# Patient Record
Sex: FEMALE | Race: WHITE | NOT HISPANIC OR LATINO | Employment: FULL TIME | URBAN - METROPOLITAN AREA
[De-identification: names, ages, dates, MRNs, and addresses within clinical notes are randomized per-mention and may not be internally consistent; named-entity substitution may affect disease eponyms.]

---

## 2017-08-05 ENCOUNTER — HOSPITAL ENCOUNTER (EMERGENCY)
Facility: HOSPITAL | Age: 37
Discharge: HOME | End: 2017-08-05
Attending: EMERGENCY MEDICINE

## 2017-08-05 VITALS
SYSTOLIC BLOOD PRESSURE: 136 MMHG | WEIGHT: 178 LBS | HEART RATE: 81 BPM | RESPIRATION RATE: 16 BRPM | TEMPERATURE: 98 F | DIASTOLIC BLOOD PRESSURE: 77 MMHG | HEIGHT: 65 IN | BODY MASS INDEX: 29.66 KG/M2 | OXYGEN SATURATION: 96 %

## 2017-08-05 DIAGNOSIS — F43.0 ANXIETY IN ACUTE STRESS REACTION: Primary | ICD-10-CM

## 2017-08-05 DIAGNOSIS — F41.1 ANXIETY IN ACUTE STRESS REACTION: Primary | ICD-10-CM

## 2017-08-05 PROCEDURE — 99283 EMERGENCY DEPT VISIT LOW MDM: CPT

## 2017-08-05 PROCEDURE — 25000003 PHARM REV CODE 250: Performed by: PHYSICIAN ASSISTANT

## 2017-08-05 RX ORDER — BUPROPION HYDROCHLORIDE 150 MG/1
150 TABLET ORAL DAILY
COMMUNITY
End: 2023-11-22

## 2017-08-05 RX ORDER — DIAZEPAM 5 MG/1
5 TABLET ORAL
Status: COMPLETED | OUTPATIENT
Start: 2017-08-05 | End: 2017-08-05

## 2017-08-05 RX ORDER — LAMOTRIGINE 150 MG/1
150 TABLET ORAL DAILY
COMMUNITY

## 2017-08-05 RX ORDER — GABAPENTIN 400 MG/1
400 CAPSULE ORAL 3 TIMES DAILY
COMMUNITY
End: 2024-01-09

## 2017-08-05 RX ADMIN — DIAZEPAM 5 MG: 5 TABLET ORAL at 01:08

## 2017-08-05 NOTE — ED PROVIDER NOTES
Encounter Date: 2017       History     Chief Complaint   Patient presents with    Dizziness     c/o dizziness with ambulation only, and reports ataxia due to dizziness     This patient is a 36-year-old female presenting to the  emergency department with complaints of dizziness and lightheadedness induced by an acute anxiety reaction.  She reports a long history of generalized anxiety disorder for which she takes multiple medications.  She does report acutely worsening stress over the last few days.  She reports she has been taking her anterior anxiety medications as prescribed but that stressors became too much tonight.  She denies any concomitant confusion, syncope, chest pain, difficulty breathing, speech or slight changes.  She denies she is pregnant.          Review of patient's allergies indicates:   Allergen Reactions    Paxil [paroxetine hcl] Anaphylaxis     Past Medical History:   Diagnosis Date    Migraine headache     Neck pain      Past Surgical History:   Procedure Laterality Date     SECTION      HYSTERECTOMY      MIDDLE EAR SURGERY       History reviewed. No pertinent family history.  Social History   Substance Use Topics    Smoking status: Current Every Day Smoker    Smokeless tobacco: Not on file    Alcohol use No     Review of Systems   Constitutional: Negative for fever.   Respiratory: Negative for shortness of breath.    Cardiovascular: Negative for chest pain.   Gastrointestinal: Negative for abdominal pain.   Neurological: Positive for light-headedness.   Psychiatric/Behavioral: Negative for confusion.       Physical Exam     Initial Vitals [17 0021]   BP Pulse Resp Temp SpO2   (!) 181/84 (!) 120 16 97.9 °F (36.6 °C) 98 %      MAP       116.33         Physical Exam    Nursing note and vitals reviewed.  Constitutional: She appears well-nourished. No distress.   HENT:   Head: Normocephalic and atraumatic.   Eyes: Conjunctivae and EOM are normal.   Neck: Normal range of  motion.   Cardiovascular: Normal rate and regular rhythm.   Pulmonary/Chest: No respiratory distress.   Abdominal: She exhibits no distension.   Musculoskeletal: Normal range of motion. She exhibits no edema.   Neurological: She is alert and oriented to person, place, and time.   Skin: Skin is warm and dry.   Psychiatric: She has a normal mood and affect. Thought content normal.         ED Course   Procedures  Labs Reviewed - No data to display          Medical Decision Making:   Initial Assessment:   This patient was interviewed and examined after being provided antianxiety medication.  At the time of my evaluation she reported that her anxiety was greatly improved and she was no longer in distress.  She does not have any concerns about accompanying symptoms tonight and reports that these are familiar.  She currently is requesting discharge for outpatient psychiatric follow-up with her doctor and I do think this is appropriate.  ED Management:  She is asked to return to ER for any new, concerning, or worsening symptoms.  Patient was agreeable with this plan for follow-up and she was discharged in stable condition with her significant other as a .                   ED Course     Clinical Impression:   The encounter diagnosis was Anxiety in acute stress reaction.    Disposition:   Disposition: Discharged  Condition: Stable                        Chapincito Ferguson MD  08/05/17 0540

## 2017-11-25 ENCOUNTER — HOSPITAL ENCOUNTER (EMERGENCY)
Facility: HOSPITAL | Age: 37
Discharge: HOME | End: 2017-11-25
Attending: EMERGENCY MEDICINE
Payer: MEDICAID

## 2017-11-25 VITALS
OXYGEN SATURATION: 97 % | HEART RATE: 85 BPM | TEMPERATURE: 99 F | DIASTOLIC BLOOD PRESSURE: 64 MMHG | BODY MASS INDEX: 25.1 KG/M2 | WEIGHT: 170 LBS | SYSTOLIC BLOOD PRESSURE: 132 MMHG | RESPIRATION RATE: 17 BRPM

## 2017-11-25 DIAGNOSIS — S62.102A CLOSED FRACTURE OF LEFT WRIST, INITIAL ENCOUNTER: Primary | ICD-10-CM

## 2017-11-25 DIAGNOSIS — S69.92XA WRIST INJURY, LEFT, INITIAL ENCOUNTER: ICD-10-CM

## 2017-11-25 PROCEDURE — 96372 THER/PROPH/DIAG INJ SC/IM: CPT

## 2017-11-25 PROCEDURE — 63600175 PHARM REV CODE 636 W HCPCS: Performed by: EMERGENCY MEDICINE

## 2017-11-25 PROCEDURE — 99283 EMERGENCY DEPT VISIT LOW MDM: CPT | Mod: 25

## 2017-11-25 PROCEDURE — 29125 APPL SHORT ARM SPLINT STATIC: CPT

## 2017-11-25 RX ORDER — MORPHINE SULFATE 2 MG/ML
6 INJECTION, SOLUTION INTRAMUSCULAR; INTRAVENOUS
Status: COMPLETED | OUTPATIENT
Start: 2017-11-25 | End: 2017-11-25

## 2017-11-25 RX ORDER — MORPHINE SULFATE 10 MG/ML
INJECTION INTRAMUSCULAR; INTRAVENOUS; SUBCUTANEOUS
Status: DISCONTINUED
Start: 2017-11-25 | End: 2017-11-25 | Stop reason: WASHOUT

## 2017-11-25 RX ORDER — HYDROCODONE BITARTRATE AND ACETAMINOPHEN 5; 325 MG/1; MG/1
1 TABLET ORAL EVERY 6 HOURS PRN
Qty: 15 TABLET | Refills: 0 | Status: SHIPPED | OUTPATIENT
Start: 2017-11-25 | End: 2017-11-29 | Stop reason: ALTCHOICE

## 2017-11-25 RX ADMIN — Medication 6 MG: at 03:11

## 2017-11-25 NOTE — ED NOTES
AAOx4, skin warm/dry, respirations even/unlabored.. Left wrist splinted.  Some swelling to left hand; limited ROM to fingers.  Capillary refill < 1 second.  Radial pulse inaccessible due to splint.

## 2017-11-25 NOTE — DISCHARGE INSTRUCTIONS
If you would like to follow up with the [facility] for further care of your  fracture, please call the [facility] Scheduling Department at [phone]  during business hours. Please let the  know you need a fracture follow-up appointment with Orthopedics, and you will be scheduled in the Orthopedic Clinic.    Please bring your original Emergency Department discharge papers and your disc of x-ray images with you to the  clinic appointment.
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
## 2017-11-29 ENCOUNTER — HOSPITAL ENCOUNTER (EMERGENCY)
Facility: HOSPITAL | Age: 37
Discharge: HOME | End: 2017-11-29
Attending: EMERGENCY MEDICINE
Payer: MEDICAID

## 2017-11-29 VITALS
SYSTOLIC BLOOD PRESSURE: 121 MMHG | OXYGEN SATURATION: 98 % | HEART RATE: 86 BPM | WEIGHT: 170 LBS | RESPIRATION RATE: 16 BRPM | TEMPERATURE: 99 F | DIASTOLIC BLOOD PRESSURE: 58 MMHG | HEIGHT: 70 IN | BODY MASS INDEX: 24.34 KG/M2

## 2017-11-29 DIAGNOSIS — L01.00 IMPETIGO: Primary | ICD-10-CM

## 2017-11-29 DIAGNOSIS — S52.502D CLOSED FRACTURE OF DISTAL END OF LEFT RADIUS WITH ROUTINE HEALING, UNSPECIFIED FRACTURE MORPHOLOGY, SUBSEQUENT ENCOUNTER: ICD-10-CM

## 2017-11-29 PROCEDURE — 99282 EMERGENCY DEPT VISIT SF MDM: CPT

## 2017-11-29 RX ORDER — MUPIROCIN 20 MG/G
OINTMENT TOPICAL 3 TIMES DAILY
Qty: 1 TUBE | Refills: 0 | Status: SHIPPED | OUTPATIENT
Start: 2017-11-29 | End: 2018-06-21

## 2017-11-29 RX ORDER — LAMOTRIGINE 100 MG/1
75 TABLET ORAL DAILY
COMMUNITY
End: 2019-11-08 | Stop reason: CLARIF

## 2017-11-29 NOTE — ED PROVIDER NOTES
"Encounter Date: 2017    SCRIBE #1 NOTE: IAbhishek, am scribing for, and in the presence of, Clara Herring NP.       History     Chief Complaint   Patient presents with    Rash     son has ringworm    Medication Refill     has broken arm. out of norco       2017 2:02 PM     Chief complaint: Rash and Medication Refill      Keily Moreno is a 37 y.o. female with a hx of Migraines, CA, COPD, Seizures, Anxiety and Depression who presents to the ED with complaints of throbbing left arm pain after a closed wrist fx x 5 days. Pt was given 5mg NORCO, reports 1 dosage left, and request a refill till her orthopedic appointment 2018. She also reports lesions on upper left shoulder, buttock, and inner thigh for a few days associated with yellow purulent drainage "when popped." She reports sick contact, pt's spouse son has ringworms. Allergens include Paxil.       The history is provided by the patient.     Review of patient's allergies indicates:   Allergen Reactions    Paxil [paroxetine hcl] Other (See Comments)     Past Medical History:   Diagnosis Date    Anxiety     Cancer     COPD (chronic obstructive pulmonary disease)     Depression     Encounter for blood transfusion     Migraine     Seizures      Past Surgical History:   Procedure Laterality Date    ABDOMINAL SURGERY       SECTION, CLASSIC      HYSTERECTOMY      TONSILLECTOMY       Family History   Problem Relation Age of Onset    Hypertension Mother     Depression Mother     Alcohol abuse Mother     Miscarriages / Stillbirths Mother     Depression Father     Hypertension Father     Hyperlipidemia Father     Alcohol abuse Father     Asthma Daughter      Social History   Substance Use Topics    Smoking status: Current Every Day Smoker     Packs/day: 1.00     Years: 10.00     Start date: 1995    Smokeless tobacco: Not on file    Alcohol use No     Review of Systems   Constitutional: Negative for fever. "   HENT: Negative for sore throat.    Eyes: Negative for redness.   Respiratory: Negative for shortness of breath.    Cardiovascular: Negative for chest pain.   Gastrointestinal: Negative for nausea and vomiting.   Genitourinary: Negative for dysuria.   Musculoskeletal: Positive for myalgias (RUE). Negative for back pain, joint swelling, neck pain and neck stiffness.   Skin: Positive for rash. Negative for color change and wound.   Neurological: Negative for weakness.   Hematological: Does not bruise/bleed easily.       Physical Exam     Initial Vitals [11/29/17 1242]   BP Pulse Resp Temp SpO2   (!) 121/58 86 16 99.1 °F (37.3 °C) 98 %      MAP       79         Physical Exam    Nursing note and vitals reviewed.  Constitutional: Vital signs are normal. She appears well-developed and well-nourished.   HENT:   Head: Normocephalic and atraumatic.   Eyes: Pupils are equal, round, and reactive to light.   Neck: Neck supple.   Cardiovascular: Normal rate, regular rhythm, normal heart sounds and intact distal pulses.   No murmur heard.  Pulmonary/Chest: Breath sounds normal.   Abdominal: Soft. Normal appearance and bowel sounds are normal.   Musculoskeletal: She exhibits no edema or tenderness.   Left arm in sugar tong splint intact; cap refill of left hand brisk; no swelling; extremity warm; no signs of neurovascular compromise.    Neurological: She is alert and oriented to person, place, and time. She has normal strength.   Skin: Skin is warm, dry and intact. Capillary refill takes less than 2 seconds. Lesion and rash noted.   Left posterior shoulder with approximately 6 1 cm lesions with with scaling; left upper thigh and bi inner thighs with similar appearing lesions without drainage or surrounding erythema.   Psychiatric: She has a normal mood and affect. Her speech is normal and behavior is normal.         ED Course   Procedures  Labs Reviewed - No data to display                APC / Resident Notes:   Patient is a 37  y.o. female who presents to the ED 11/30/2017 who underwent emergent evaluation for rash and left upper shoulder pain.  Patient has erythema A shows maculopapular lesions with central scaling to left shoulder and left inner thigh and right inner thigh and left posterior thigh; he should has child with current impetigo.  She has no oral or in 12 lesions.  Lesions without fluctuance or drainage or surrounding erythema; doubt abscess or cellulitis.  Left upper extremity in sugar tong splint.  Recent left wrist x-ray consistent with fracture.  Left hand warm with brisk cap refill no swelling; no signs of neurovascular compromise.  He saw requesting more narcotic pain medication.  Patient has a known history of opioid abuse.  Patient recommended to take NSAIDs as needed for pain and orthopedic follow-up return precautions discussed; patient verbalized understanding.  patient discharged in stable condition.          Scribe Attestation:   Scribe #1: I performed the above scribed service and the documentation accurately describes the services I performed. I attest to the accuracy of the note.    Attending Attestation:     Physician Attestation Statement for NP/PA:   I have conducted a face to face encounter with this patient in addition to the NP/PA, due to NP/PA Request    Other NP/PA Attestation Additions:      Medical Decision Making: Keily Moreno is a 37 y.o. female presenting with rash marked by macular lesions on the extremities and trunk in the setting of known impetigo and child.  No central clearing.  There is some mild scaling noted.  There is no induration with mild erythema present.  There are nontender with negative Nikolsky sign.  No intraoral lesions.  I suspect impetigo.  Topical knee process until resolved recommended with PCP follow-up.  Patient has known left radial and ulnar fracture.  Seen here with splint for mobilization.  2+ radial pulses on exam with intact distal motor and sensory function.  I  strongly encouraged her to follow up with orthopedics as recommended.  Multiple days have passed and she exhausted prescription for opioids.  The patient does have a prior history of opioid abuse.  I do not think more prolonged course of opioids is in her best interest and NSAIDs as necessary for now pending orthopedic follow-up is appropriate.  I doubt compartment syndrome.  No neurovascular compromise.  No recent recurrent injury.  I do not think repeat imaging is indicated.  Follow-up as planned.  Return precautions reviewed.       Physician Attestation for Scribe:  Physician Attestation Statement for Scribe #1: I, Clara Herring, reviewed documentation, as scribed by in my presence, and it is both accurate and complete.     Comments: I, Clara Herring NP-C, personally performed the services described in this documentation. All medical record entries made by the scribe were at my direction and in my presence.  I have reviewed the chart and agree that the record reflects my personal performance and is accurate and complete. CASSIE Her.  7:43 PM 11/30/2017             ED Course      Clinical Impression:     1. Impetigo    2. Closed fracture of distal end of left radius with routine healing, unspecified fracture morphology, subsequent encounter        Disposition:   Disposition: Discharged  Condition: Stable                        Clara Herring NP  11/30/17 1943

## 2018-05-25 ENCOUNTER — TELEPHONE (OUTPATIENT)
Dept: TRANSPLANT | Facility: CLINIC | Age: 38
End: 2018-05-25

## 2018-05-25 NOTE — TELEPHONE ENCOUNTER
----- Message from Clarence Boswell sent at 5/25/2018  2:34 PM CDT -----  We have the pt recorders and they are now pending review by the referral nurse.  By:Clarence Boswell

## 2018-05-29 ENCOUNTER — DOCUMENTATION ONLY (OUTPATIENT)
Dept: TRANSPLANT | Facility: CLINIC | Age: 38
End: 2018-05-29

## 2018-05-29 NOTE — LETTER
May 29, 2018    Kesha Walker, [facility]  [address] [facility] [address]      Dear Dr. Walker    Patient: Keily Moreno   MR Number: [id number]   YOB: 1980     Thank you for the referral of Keily Moreno to the [facility] program. An initial appointment will be scheduled for your patient with one of our Hepatologists.      Thank you again for your trust in our program.  If there is anything we can do for you or your staff, please feel free to contact us.        Sincerely,        [facility]  [address] [place] [id number]  [phone]

## 2018-05-29 NOTE — LETTER
May 29, 2018    Keily Moreno  [address] [facility] [address]      Dear Keily Moreno:    Your doctor has referred you to the [facility]. You will be contacted by our office and an initial appointment will then be scheduled for you.    We look forward to seeing you soon. If you have any further questions, please contact us at [phone].       Sincerely,        [facility]   [address] [place] [address] [phone]

## 2018-05-29 NOTE — NURSING
Pt records reviewed.  Pt will be referred to Hepatitis C Clinic due to HEP C   Initial referral received from Kesha Walker NP   Referral letter sent to provider and patient.

## 2018-06-05 DIAGNOSIS — B18.2 CHRONIC HEPATITIS C WITHOUT HEPATIC COMA: Primary | ICD-10-CM

## 2018-06-20 ENCOUNTER — TELEPHONE (OUTPATIENT)
Dept: HEPATOLOGY | Facility: CLINIC | Age: 38
End: 2018-06-20

## 2018-06-20 NOTE — TELEPHONE ENCOUNTER
Deanna with patient access spoke with patient.  Patient asked that appts in hepatology scheduled 6/21 be cancelled because she has insurance issues. Patient states that she will call us back to reschedule once issue resolved; appts cancelled.

## 2018-06-20 NOTE — TELEPHONE ENCOUNTER
----- Message from Jeanne Bell sent at 6/20/2018 12:43 PM CDT -----  Contact: pt  Needs Advice    Reason for call:    Pt called to request 6/21/18 appt back.   Communication Preference:  [phone]  Additional Information: n/a

## 2018-06-21 ENCOUNTER — INITIAL CONSULT (OUTPATIENT)
Dept: HEPATOLOGY | Facility: CLINIC | Age: 38
End: 2018-06-21
Payer: MEDICAID

## 2018-06-21 ENCOUNTER — TELEPHONE (OUTPATIENT)
Dept: HEPATOLOGY | Facility: CLINIC | Age: 38
End: 2018-06-21

## 2018-06-21 ENCOUNTER — PROCEDURE VISIT (OUTPATIENT)
Dept: HEPATOLOGY | Facility: CLINIC | Age: 38
End: 2018-06-21
Attending: PHYSICIAN ASSISTANT
Payer: MEDICAID

## 2018-06-21 VITALS
BODY MASS INDEX: 28.15 KG/M2 | SYSTOLIC BLOOD PRESSURE: 114 MMHG | DIASTOLIC BLOOD PRESSURE: 66 MMHG | RESPIRATION RATE: 18 BRPM | WEIGHT: 190.06 LBS | OXYGEN SATURATION: 97 % | HEIGHT: 69 IN | HEART RATE: 82 BPM | TEMPERATURE: 98 F

## 2018-06-21 DIAGNOSIS — B18.2 CHRONIC HEPATITIS C WITHOUT HEPATIC COMA: ICD-10-CM

## 2018-06-21 DIAGNOSIS — D64.9 ANEMIA, UNSPECIFIED TYPE: Primary | ICD-10-CM

## 2018-06-21 DIAGNOSIS — B18.2 CHRONIC HEPATITIS C WITHOUT HEPATIC COMA: Primary | ICD-10-CM

## 2018-06-21 PROCEDURE — 99999 PR PBB SHADOW E&M-EST. PATIENT-LVL IV: CPT | Mod: PBBFAC,,, | Performed by: PHYSICIAN ASSISTANT

## 2018-06-21 PROCEDURE — 99203 OFFICE O/P NEW LOW 30 MIN: CPT | Mod: S$PBB,,, | Performed by: PHYSICIAN ASSISTANT

## 2018-06-21 PROCEDURE — 91200 LIVER ELASTOGRAPHY: CPT | Mod: 26,S$PBB,, | Performed by: PHYSICIAN ASSISTANT

## 2018-06-21 PROCEDURE — 91200 LIVER ELASTOGRAPHY: CPT | Mod: PBBFAC | Performed by: PHYSICIAN ASSISTANT

## 2018-06-21 PROCEDURE — 99214 OFFICE O/P EST MOD 30 MIN: CPT | Mod: PBBFAC | Performed by: PHYSICIAN ASSISTANT

## 2018-06-21 RX ORDER — BUSPIRONE HYDROCHLORIDE 7.5 MG/1
TABLET ORAL
Refills: 2 | COMMUNITY
Start: 2018-04-26 | End: 2019-11-08 | Stop reason: CLARIF

## 2018-06-21 NOTE — PROGRESS NOTES
HEPATOLOGY CLINIC VISIT NOTE - HCV clinic    OUTSIDE REFERRING PROVIDER: Kesha Walker NP    CHIEF COMPLAINT: Hepatitis C   (here w/ boyfriend    HISTORY: This is a 37 y.o. White female with chronic hepatitis C, here for further eval / mngmt.   Outside records have been received / reviewed.    HCV history:  Recently diagnosed by PCP  Risks for HCV: Tattoo placement, first one     IVDA - first use ; has been clean for 18 months (since 2016)    Incarcerated [id number]; (+) tattoo placement while there    Sexual relations w/ female that had HCV -     (+) blood transfusions, but none before     - Treatment naive  - Genotype 2b  - HCV RNA 1,420,000 IU/mL - 2018 (outside labs)    Liver staging:  FibroScan today 18 - kPa 3.5, F0-1  No liver imaging    Labs reveal well preserved liver function, normal liver panel    Feels well  Denies jaundice, dark urine, abdominal distention, hematemesis, melena, slowed mentation.   No abnormal skin rashes. No generalized joint pain.       Past Medical History:   Diagnosis Date    Anxiety     Chronic hepatitis C     Depression     Encounter for blood transfusion , , 2012    History of cervical cancer 2006    diagnosed , hysterectomy     Migraine     previously on topamax, imitrex, fioricet    Seizures     occurred during benzo detox       Past Surgical History:   Procedure Laterality Date    ABDOMINAL SURGERY       SECTION, CLASSIC      HYSTERECTOMY      TONSILLECTOMY         FAMILY HISTORY: Negative for liver disease    SOCIAL HISTORY:   History   Smoking Status    Current Every Day Smoker    Packs/day: 1.00    Years: 10.00    Start date: 1995   Smokeless Tobacco    Not on file       Alcohol - up to 12 pack per day x 1 year, none x 18 months  Drugs - prior drug use, see above. Clean since 2016. Currently enrolled in drug court      ROS:   No fever, chills, weight loss, fatigue  No chest pain, palpitations,  dyspnea, cough  No abdominal pain, change in bowel pattern, nausea, vomiting, GERD  No dysuria, hematuria   No skin rashes   No headaches  No lower extremity edema  No depression or anxiety      PHYSICAL EXAM:  Friendly White female, in no acute distress; alert and oriented to person, place and time  VITALS: reviewed  HEENT: Sclerae anicteric.   NECK: Supple  CVS: Regular rate and rhythm. No murmurs  LUNGS: Normal respiratory effort. Clear bilaterally  ABDOMEN: Flat, soft, nontender. No organomegaly or masses. No ascites or hernias. Good bowel sounds.    SKIN: Warm and dry. No jaundice, No obvious rashes. (+) tattoos  EXTREMITIES: No lower extremity edema  NEURO/PSYCH: Normal gate. Memory intact. Thought and speech pattern appropriate. Behavior normal. No depression or anxiety noted.    RECENT LABS:  Outside labs  3/21/18  HBsAg neg  AST 26, ALT 35, TBILI 0.3, ALBUMIN 4.4, ALKPHOS 48  2/26/18  WBC 6.1, HGB 11.4,     RECENT IMAGING:  None to review    ASSESSMENT  37 y.o. White female with:  1. CHRONIC HEPATITIS C, GENOTYPE 2b - treatment naive  -- FibroScan today 6/21/18 - kPa 3.5, F0-1  -- Unknown Immunity to HAV & HBV    2. HISTORY OF DRUG USE, CLEAN SINCE 12/2016        EDUCATION:  The natural history of Hepatitis C, including potential progression to cirrhosis was reviewed.   We discussed the increased progression of liver disease secondary to alcohol use; patient was advised to avoid alcohol completely.     Transmission of Hepatitis C was reviewed, including possible sexual transmission. Sexual contacts should be screened. Risk of vertical transmission of Hepatitis C from mother to baby was reviewed. Children should be screened. Patient should avoid sharing personal products such as razors, toothbrushes, etc.     We reviewed that vaccination against Hepatitis A and Hepatitis B is recommended if patient does not already have immunity.    Recommend avoiding raw seafood.  Limit acetaminophen to 2000mg  daily.          PLAN:  1. Labs today  Orders Placed This Encounter   Procedures    CBC auto differential    Comprehensive metabolic panel    Protime-INR    HIV-1 and HIV-2 antibodies    Hepatitis B surface antigen    Hepatitis B surface antibody    Hepatitis B core antibody, total    Hepatitis A antibody, IgG    HCV FibroSURE     2. U/S abdomen  3. F/U visit. Goal of antiviral therapy

## 2018-06-21 NOTE — PROCEDURES
Procedures     Fibroscan Procedure     Name: Keily Moreno  Date of Procedure : 2018   :: Jennifer B Scheuermann, PA  Diagnosis: HCV    Probe: XL    Fibroscan reading: 3.5 KPa    Fibrosis:F 0-1     CAP readin dB/m    Steatosis: :S2

## 2018-06-21 NOTE — TELEPHONE ENCOUNTER
6/21/18 labs reviewed:  CBC - anemia  Hgb 10.9, MCV 80    Spoke to pt:  Prior hysterectomy  No vaginal bleeding  (+) BRBPR - on tissue after BMs intermittently  (+) daily BC powder  No abdominal pain but decreased appetite  No n/v, black stool, hematemesis  No hematuria    She had appt scheduled w/ outside GI last week but had to cancel due to insurance issues. Plans to r/s    Recommendations:  1. Labs Fri 6/22 - ferritin, fe/tibc, b12, folate  2. Pt to r/s GI appt  3. D/C BC powder. Can take tylenol up to 2,000-3,000mg daily

## 2018-06-25 ENCOUNTER — LAB VISIT (OUTPATIENT)
Dept: LAB | Facility: HOSPITAL | Age: 38
End: 2018-06-25
Attending: PHYSICIAN ASSISTANT
Payer: MEDICAID

## 2018-06-25 ENCOUNTER — PATIENT MESSAGE (OUTPATIENT)
Dept: HEPATOLOGY | Facility: CLINIC | Age: 38
End: 2018-06-25

## 2018-06-25 ENCOUNTER — TELEPHONE (OUTPATIENT)
Dept: HEPATOLOGY | Facility: CLINIC | Age: 38
End: 2018-06-25

## 2018-06-25 DIAGNOSIS — D64.9 ANEMIA, UNSPECIFIED TYPE: ICD-10-CM

## 2018-06-25 LAB
FERRITIN SERPL-MCNC: 12 NG/ML
FOLATE SERPL-MCNC: 4.3 NG/ML
IRON SERPL-MCNC: 19 UG/DL
SATURATED IRON: 5 %
TOTAL IRON BINDING CAPACITY: 370 UG/DL
TRANSFERRIN SERPL-MCNC: 250 MG/DL
VIT B12 SERPL-MCNC: 780 PG/ML

## 2018-06-25 PROCEDURE — 82728 ASSAY OF FERRITIN: CPT

## 2018-06-25 PROCEDURE — 36415 COLL VENOUS BLD VENIPUNCTURE: CPT | Mod: PO

## 2018-06-25 PROCEDURE — 83540 ASSAY OF IRON: CPT

## 2018-06-25 PROCEDURE — 82607 VITAMIN B-12: CPT

## 2018-06-25 PROCEDURE — 82746 ASSAY OF FOLIC ACID SERUM: CPT

## 2018-06-25 NOTE — TELEPHONE ENCOUNTER
Results reviewed   6/25/2018 08:01   Iron 19 (L)   TIBC 370   Saturated Iron 5 (L)   Transferrin 250   Ferritin 12 (L)   Folate 4.3   Vitamin B-12 780       (+) Fe deficiency  Pt notified via [facility]  Inquired whether pt has rescheduled appt w/ GI and made sure she stopped BC powder. Likely needs EGD

## 2018-06-28 ENCOUNTER — TELEPHONE (OUTPATIENT)
Dept: HEPATOLOGY | Facility: CLINIC | Age: 38
End: 2018-06-28

## 2018-06-28 NOTE — TELEPHONE ENCOUNTER
6/21/18 labs:  Prior resolved HBV infection  Lacking immunity to HAV    Vaccine for HAV sent to [facility] [place] to investigate benefits    Pt [facility]

## 2018-07-02 ENCOUNTER — HOSPITAL ENCOUNTER (OUTPATIENT)
Dept: RADIOLOGY | Facility: CLINIC | Age: 38
Discharge: HOME | End: 2018-07-02
Attending: PHYSICIAN ASSISTANT
Payer: MEDICAID

## 2018-07-02 DIAGNOSIS — B18.2 CHRONIC HEPATITIS C WITHOUT HEPATIC COMA: ICD-10-CM

## 2018-07-02 PROCEDURE — 76700 US EXAM ABDOM COMPLETE: CPT | Mod: TC,PO

## 2018-07-02 PROCEDURE — 76700 US EXAM ABDOM COMPLETE: CPT | Mod: 26,,, | Performed by: RADIOLOGY

## 2018-07-11 ENCOUNTER — PATIENT MESSAGE (OUTPATIENT)
Dept: HEPATOLOGY | Facility: CLINIC | Age: 38
End: 2018-07-11

## 2018-08-03 ENCOUNTER — OFFICE VISIT (OUTPATIENT)
Dept: HEPATOLOGY | Facility: CLINIC | Age: 38
End: 2018-08-03
Payer: MEDICAID

## 2018-08-03 ENCOUNTER — TELEPHONE (OUTPATIENT)
Dept: PHARMACY | Facility: CLINIC | Age: 38
End: 2018-08-03

## 2018-08-03 ENCOUNTER — LAB VISIT (OUTPATIENT)
Dept: LAB | Facility: HOSPITAL | Age: 38
End: 2018-08-03
Payer: MEDICAID

## 2018-08-03 VITALS
WEIGHT: 190.06 LBS | HEIGHT: 71 IN | HEART RATE: 88 BPM | SYSTOLIC BLOOD PRESSURE: 122 MMHG | BODY MASS INDEX: 26.61 KG/M2 | DIASTOLIC BLOOD PRESSURE: 72 MMHG | OXYGEN SATURATION: 100 %

## 2018-08-03 DIAGNOSIS — R74.8 ABNORMAL TRANSAMINASES: ICD-10-CM

## 2018-08-03 DIAGNOSIS — B18.2 CHRONIC HEPATITIS C WITHOUT HEPATIC COMA: ICD-10-CM

## 2018-08-03 DIAGNOSIS — B18.2 CHRONIC HEPATITIS C WITHOUT HEPATIC COMA: Primary | ICD-10-CM

## 2018-08-03 PROCEDURE — 87522 HEPATITIS C REVRS TRNSCRPJ: CPT

## 2018-08-03 PROCEDURE — 99999 PR PBB SHADOW E&M-EST. PATIENT-LVL III: CPT | Mod: PBBFAC,,, | Performed by: PHYSICIAN ASSISTANT

## 2018-08-03 PROCEDURE — 99213 OFFICE O/P EST LOW 20 MIN: CPT | Mod: S$PBB,,, | Performed by: PHYSICIAN ASSISTANT

## 2018-08-03 PROCEDURE — 99213 OFFICE O/P EST LOW 20 MIN: CPT | Mod: PBBFAC | Performed by: PHYSICIAN ASSISTANT

## 2018-08-03 PROCEDURE — 36415 COLL VENOUS BLD VENIPUNCTURE: CPT

## 2018-08-03 NOTE — PROGRESS NOTES
HEPATOLOGY CLINIC VISIT NOTE - HCV clinic    CHIEF COMPLAINT: Hepatitis C   (here w/ boyfriend)    HISTORY: This is a 37 y.o. White female with chronic hepatitis C, here for f/u after having additional labs, imaging, liver staging.    Interval history:  Labs after visit revealed microcytic anemia (Hgb 10.9) w/ Fe def; normal B12 and folate   - has had prior hysterectomy and denies vaginal bleeding   - reported hematochezia - blood on tissues intermittently   - (+) daily BC powder and fatigue  Was advised to take iron but not taking it due to hx of severe constipation (recently started linzess)  Saw outside GI (Dr Guerra) on 8/1. Protonix, zofran prescribed; has EGD scheduled next week      HIV screen neg  Prior HBV infection w/ grayzone HBsAb and neg HBsAg  Lacking HAV immunity - Rx sent to [facility] [place] 6/28/18 but hasn't heard anything    Additional liver staging again indicates minimal disease    Doing well overall  Decreased appetite, nausea, abd discomfort that she has discussed w/ GI  Denies jaundice, dark urine, hematemesis, melena, slowed mentation, abdominal distention.       HCV history:  Recently diagnosed by PCP  Risks for HCV: Tattoo placement, first one 2002    IVDA - first use 2008; has been clean for 18 months (since 12/2016)    Incarcerated [id number]; (+) tattoo placement while there    Sexual relations w/ female that had HCV - 2011    (+) blood transfusions, but none before 1992    - Treatment naive  - Genotype 2b  - HCV RNA 1,420,000 IU/mL - 4/2018 (outside labs)    Liver staging:  FibroScan today 6/21/18 - kPa 3.5, F0-1  HCV FibroSURE 6/21/18 - A1-2, F0        Past Medical History:   Diagnosis Date    Anxiety     Chronic hepatitis C     Depression     Encounter for blood transfusion 2004, 2008, 2012    History of cervical cancer 2006    diagnosed 2006, hysterectomy 2012    Migraine     previously on topamax, imitrex, fioricet    Seizures 2009    occurred during benzo detox        Past Surgical History:   Procedure Laterality Date     SECTION, CLASSIC      HYSTERECTOMY      TONSILLECTOMY         FAMILY HISTORY: Negative for liver disease    SOCIAL HISTORY:   2 year old son w/ boyfriend as well as 3 older children (ages 9 through teenage)  History   Smoking Status    Former Smoker    Packs/day: 1.00    Years: 10.00    Start date: 1995    Quit date: 2017   Smokeless Tobacco    Not on file       Alcohol - up to 12 pack per day x 1 year, none > 19 months  Drugs - prior drug use, see above. Clean since 2016. Currently enrolled in drug court      ROS:   No fever, chills, weight loss, fatigue  No chest pain, palpitations, dyspnea, cough  (+) abdominal pain, (+) constipation, (+) nausea, No vomiting, GERD  No skin rashes   No headaches  No depression or anxiety      PHYSICAL EXAM:  Friendly White female, in no acute distress; alert and oriented to person, place and time  VITALS: reviewed  HEENT: Sclerae anicteric.   NECK: Supple  LUNGS: Normal respiratory effort.   ABDOMEN: Flat, soft, nondistended  SKIN: Warm and dry. No jaundice, No obvious rashes. (+) tattoos  NEURO/PSYCH: Normal gate. Memory intact. Thought and speech pattern appropriate. Behavior normal. No depression or anxiety noted.    RECENT LABS:  Outside labs  3/21/18  HBsAg neg  AST 26, ALT 35, TBILI 0.3, ALBUMIN 4.4, ALKPHOS 48  18  WBC 6.1, HGB 11.4,     RECENT IMAGING:  Results for orders placed during the hospital encounter of 18   US Abdomen Complete    Narrative EXAMINATION:  US ABDOMEN COMPLETE    CLINICAL HISTORY:  Chronic viral hepatitis C    COMPARISON:  None    FINDINGS:  There is elongation of the right hepatic lobe which is a normal variant.  The liver is otherwise unremarkable.  The gallbladder and bile ducts are normal.  Common bile duct diameter is 4.8 mm.  Doppler of the main portal vein confirms a normal waveform and direction of flow.  The spleen and pancreas are  normal in size and appearance.  A calculus measuring 6 mm is present in the lower pole of the right kidney.  The kidneys are otherwise unremarkable.  The right kidney measures 11.4 cm in length and the left kidney measures 11.7 cm.  The aorta tapers normally and the inferior vena cava is unremarkable.  No ascites is present.      Impression 1. Elongation of the right hepatic lobe which is a normal variant.  2. Small nonobstructing right renal calculus.      Electronically signed by: Kojo Ng MD  Date:    07/02/2018  Time:    11:47       ASSESSMENT  37 y.o. White female with:  1. CHRONIC HEPATITIS C, GENOTYPE 2b - treatment naive  -- FibroScan 6/21/18 - kPa 3.5, F0-1  -- lacking Immunity to HAV   -- prior exposure to HBV (pos HBcAb)    2. HISTORY OF DRUG USE, CLEAN SINCE 12/2016    3. FE DEF ANEMIA, ABD PAIN, NAUSEA, HX OF BC POWDER USE  -- following w/ GI, EGD scheduled next week    EDUCATION:  Discussed goal of HCV eradication to prevent progression of liver disease.  Discussed use of Mavyret (3 pills daily with food) x 8 weeks with potential side effects of fatigue, headache, nausea, diarrhea.     Discussed potential for drug interactions with Mavyret.  Current med list reviewed - no interactions noted.  Pt to contact me if new medicines are prescribed.  Herbal / alternative therapies must be avoided.    Discussed importance of medication adherence and risk of treatment failure / viral resistance if not adherent. Pt has verbalized understanding.          PLAN:  1. HAV vaccine to [facility]  2. HCV RNA today  3. Obtain authorization to treat HCV with Mavyret daily x 8 weeks  -- Rx will be routed to [facility]  -- Patient will notify me of exact treatment start date so appropriate lab f/u can be scheduled.    Cc: Kesha Walker NP

## 2018-08-06 LAB
HCV LOG: 6.04 LOG (10) IU/ML
HCV RNA QUANT PCR: ABNORMAL IU/ML
HCV, QUALITATIVE: DETECTED IU/ML

## 2018-08-27 NOTE — TELEPHONE ENCOUNTER
Assisting patient in applying to [employer] patient assistance foundation since her insurance has denied her Mavyret. Sending Jennifer Scheuermann PA a staff message regarding assistance and application for signature.

## 2018-08-29 ENCOUNTER — EPISODE CHANGES (OUTPATIENT)
Dept: HEPATOLOGY | Facility: CLINIC | Age: 38
End: 2018-08-29

## 2018-09-12 ENCOUNTER — TELEPHONE (OUTPATIENT)
Dept: HEPATOLOGY | Facility: CLINIC | Age: 38
End: 2018-09-12

## 2018-09-12 NOTE — TELEPHONE ENCOUNTER
FOR DOCUMENTATION ONLY:  Financial Assistance for Mavyret is approved from 9-12-18 to 3-11-19  (8 weeks)  Source [employer] Patient Assistance  Sending a staff message to Jennifer Scheuermann PA regarding approval.

## 2018-09-12 NOTE — TELEPHONE ENCOUNTER
----- Message from Rashida Nielsen sent at 9/12/2018 11:02 AM CDT -----  Regarding: Mavyret Assistance Approval     Patient was approved to receive her Mavyret from [employer] Patient Assistance X 8 weeks. Patient has been informed.  Thank you  Shara  [id number]

## 2018-09-17 ENCOUNTER — PATIENT MESSAGE (OUTPATIENT)
Dept: HEPATOLOGY | Facility: CLINIC | Age: 38
End: 2018-09-17

## 2018-09-17 DIAGNOSIS — D64.9 ANEMIA, UNSPECIFIED TYPE: Primary | ICD-10-CM

## 2018-09-20 ENCOUNTER — TELEPHONE (OUTPATIENT)
Dept: PHARMACY | Facility: CLINIC | Age: 38
End: 2018-09-20

## 2018-09-20 ENCOUNTER — LAB VISIT (OUTPATIENT)
Dept: LAB | Facility: HOSPITAL | Age: 38
End: 2018-09-20
Attending: PHYSICIAN ASSISTANT
Payer: MEDICAID

## 2018-09-20 ENCOUNTER — PATIENT MESSAGE (OUTPATIENT)
Dept: HEPATOLOGY | Facility: CLINIC | Age: 38
End: 2018-09-20

## 2018-09-20 DIAGNOSIS — D64.9 ANEMIA, UNSPECIFIED TYPE: ICD-10-CM

## 2018-09-20 LAB
BASOPHILS # BLD AUTO: 0.09 K/UL
BASOPHILS NFR BLD: 1.3 %
DIFFERENTIAL METHOD: ABNORMAL
EOSINOPHIL # BLD AUTO: 0.1 K/UL
EOSINOPHIL NFR BLD: 1.7 %
ERYTHROCYTE [DISTWIDTH] IN BLOOD BY AUTOMATED COUNT: 15.2 %
HCT VFR BLD AUTO: 36.8 %
HGB BLD-MCNC: 11.1 G/DL
IMM GRANULOCYTES # BLD AUTO: 0.02 K/UL
IMM GRANULOCYTES NFR BLD AUTO: 0.3 %
LYMPHOCYTES # BLD AUTO: 2.3 K/UL
LYMPHOCYTES NFR BLD: 32.1 %
MCH RBC QN AUTO: 24.1 PG
MCHC RBC AUTO-ENTMCNC: 30.2 G/DL
MCV RBC AUTO: 80 FL
MONOCYTES # BLD AUTO: 0.6 K/UL
MONOCYTES NFR BLD: 8.1 %
NEUTROPHILS # BLD AUTO: 4 K/UL
NEUTROPHILS NFR BLD: 56.5 %
NRBC BLD-RTO: 0 /100 WBC
PLATELET # BLD AUTO: 347 K/UL
PMV BLD AUTO: 11.4 FL
RBC # BLD AUTO: 4.6 M/UL
WBC # BLD AUTO: 7 K/UL

## 2018-09-20 PROCEDURE — 36415 COLL VENOUS BLD VENIPUNCTURE: CPT | Mod: PO

## 2018-09-20 PROCEDURE — 85025 COMPLETE CBC W/AUTO DIFF WBC: CPT

## 2018-09-20 NOTE — TELEPHONE ENCOUNTER
Patient called to pharmacy to report that she received Mavyret from [facility].     The following was reinforced:    Initial Mavyret consult completed on 9/20. Mavyret was received from [facility] and subsequent refills will be issue by same.      Mavyret 100/40mg- Take three tablets by mouth once daily WITH FOOD x 8 weeks  Counseling was reviewed:   1. Patient MUST take Mavret at the SAME time every day.   2. Potential Side effects include: nausea, headaches, diarrhea and fatigue.   Headache: Patient may treat with OTC remedies. If Tylenol is used, dose should not exceed 2000mg per day.    4. Medication list reviewed. No DDIs or allergies noted. Patient MUST contact myself or provider prior to starting any new OTC, herbal, or prescription drugs to avoid potential DDIs.    DDI: Medication list reviewed and potential DDIs addressed.    Discussed the importance of staying well hydrated while on therapy. Compliance stressed - patient to take missed doses as soon as remembered, but NOT to take 2 doses in one day. Patient will report questions or concerns to myself or practitioner. Patient verbalizes understanding.  Consultation included: indication; goals of treatment; administration; storage and handling; side effects; how to handle side effects; the importance of compliance; how to handle missed doses; the importance of laboratory monitoring; the importance of keeping all follow up appointments.  Patient understands that all future pharmaceutical care relative to Mavyret will be provided by [facility].     YANCY Bryan.Ph.  Clinical Pharmacist  [facility]  Phone: [phone]

## 2018-09-24 ENCOUNTER — TELEPHONE (OUTPATIENT)
Dept: HEPATOLOGY | Facility: CLINIC | Age: 38
End: 2018-09-24

## 2018-09-24 DIAGNOSIS — B18.2 CHRONIC HEPATITIS C WITHOUT HEPATIC COMA: Primary | ICD-10-CM

## 2018-09-25 ENCOUNTER — TELEPHONE (OUTPATIENT)
Dept: HEPATOLOGY | Facility: CLINIC | Age: 38
End: 2018-09-25

## 2018-09-25 ENCOUNTER — EPISODE CHANGES (OUTPATIENT)
Dept: HEPATOLOGY | Facility: CLINIC | Age: 38
End: 2018-09-25

## 2018-09-25 NOTE — TELEPHONE ENCOUNTER
9/20 CBC  Hgb 11.1 (up from 10.9)    Using prenatal vitamins daily for Fe.  Recommend Ferrous sulfate 325mg, 3 tabs PO every OTHER day.    Will f/u w/ CBC during HCV rx.

## 2018-09-25 NOTE — TELEPHONE ENCOUNTER
Pt beginning 8 weeks of Mavyret on 9/24/18  Malini 2, tx naive  F0-1    Pls schedule:  - CMP, HCV RNA, HBV DNA at week 4 - 10/19  - CMP, HCV RNA, HBV DNA at week 8 - end of treatment - 11/16  - CMP, HCV RNA, HBV DNA - SVR12 - 2/11

## 2018-10-19 ENCOUNTER — LAB VISIT (OUTPATIENT)
Dept: LAB | Facility: HOSPITAL | Age: 38
End: 2018-10-19
Attending: PHYSICIAN ASSISTANT
Payer: MEDICAID

## 2018-10-19 DIAGNOSIS — B18.2 CHRONIC HEPATITIS C WITHOUT HEPATIC COMA: ICD-10-CM

## 2018-10-19 LAB
ALBUMIN SERPL BCP-MCNC: 3.9 G/DL
ALP SERPL-CCNC: 39 U/L
ALT SERPL W/O P-5'-P-CCNC: 17 U/L
ANION GAP SERPL CALC-SCNC: 7 MMOL/L
AST SERPL-CCNC: 17 U/L
BILIRUB SERPL-MCNC: 0.3 MG/DL
BUN SERPL-MCNC: 13 MG/DL
CALCIUM SERPL-MCNC: 9.9 MG/DL
CHLORIDE SERPL-SCNC: 105 MMOL/L
CO2 SERPL-SCNC: 26 MMOL/L
CREAT SERPL-MCNC: 0.8 MG/DL
EST. GFR  (AFRICAN AMERICAN): >60 ML/MIN/1.73 M^2
EST. GFR  (NON AFRICAN AMERICAN): >60 ML/MIN/1.73 M^2
GLUCOSE SERPL-MCNC: 86 MG/DL
POTASSIUM SERPL-SCNC: 4.7 MMOL/L
PROT SERPL-MCNC: 7.5 G/DL
SODIUM SERPL-SCNC: 138 MMOL/L

## 2018-10-19 PROCEDURE — 80053 COMPREHEN METABOLIC PANEL: CPT

## 2018-10-19 PROCEDURE — 87522 HEPATITIS C REVRS TRNSCRPJ: CPT

## 2018-10-19 PROCEDURE — 87517 HEPATITIS B DNA QUANT: CPT

## 2018-10-22 ENCOUNTER — EPISODE CHANGES (OUTPATIENT)
Dept: HEPATOLOGY | Facility: CLINIC | Age: 38
End: 2018-10-22

## 2018-10-22 LAB
HCV LOG: <1.08 LOG (10) IU/ML
HCV RNA QUANT PCR: <12 IU/ML
HCV, QUALITATIVE: DETECTED IU/ML
HEPATITIS B VIRAL DNA - QUANTITATIVE: <10 IU/ML
HEPATITIS B VIRUS DNA: NOT DETECTED
LOG HBV IU/ML: <1 LOG (10) IU/ML

## 2018-10-24 ENCOUNTER — TELEPHONE (OUTPATIENT)
Dept: HEPATOLOGY | Facility: CLINIC | Age: 38
End: 2018-10-24

## 2018-10-24 DIAGNOSIS — D64.9 ANEMIA, UNSPECIFIED TYPE: Primary | ICD-10-CM

## 2018-10-24 NOTE — TELEPHONE ENCOUNTER
HCV LAB REVIEW  Week 4 of Mavyret, planning on 8 weeks treatment  G2, tx naive  F0-1    Pertinent labs:  10/19  CMP stable  HCV <12, detected  HBV neg    [facility] message sent to pt:  Labs reviewed  - Continue Mavyret - 3 pills all together with food once daily - don't miss any doses.      Next labs due / pls schedule:  - CMP, HCV RNA, HBV DNA at week 8 - end of treatment - 11/16  - CMP, HCV RNA, HBV DNA - SVR12 - 2/11

## 2018-11-29 ENCOUNTER — EPISODE CHANGES (OUTPATIENT)
Dept: HEPATOLOGY | Facility: CLINIC | Age: 38
End: 2018-11-29

## 2018-11-29 ENCOUNTER — LAB VISIT (OUTPATIENT)
Dept: LAB | Facility: HOSPITAL | Age: 38
End: 2018-11-29
Attending: PHYSICIAN ASSISTANT
Payer: MEDICAID

## 2018-11-29 DIAGNOSIS — B18.2 CHRONIC HEPATITIS C WITHOUT HEPATIC COMA: ICD-10-CM

## 2018-11-29 DIAGNOSIS — D64.9 ANEMIA, UNSPECIFIED TYPE: ICD-10-CM

## 2018-11-29 LAB
ALBUMIN SERPL BCP-MCNC: 3.9 G/DL
ALP SERPL-CCNC: 44 U/L
ALT SERPL W/O P-5'-P-CCNC: 15 U/L
ANION GAP SERPL CALC-SCNC: 7 MMOL/L
AST SERPL-CCNC: 18 U/L
BASOPHILS # BLD AUTO: 0.07 K/UL
BASOPHILS NFR BLD: 1 %
BILIRUB SERPL-MCNC: 0.3 MG/DL
BUN SERPL-MCNC: 11 MG/DL
CALCIUM SERPL-MCNC: 9.7 MG/DL
CHLORIDE SERPL-SCNC: 106 MMOL/L
CO2 SERPL-SCNC: 25 MMOL/L
CREAT SERPL-MCNC: 1 MG/DL
DIFFERENTIAL METHOD: ABNORMAL
EOSINOPHIL # BLD AUTO: 0 K/UL
EOSINOPHIL NFR BLD: 0.6 %
ERYTHROCYTE [DISTWIDTH] IN BLOOD BY AUTOMATED COUNT: 14.7 %
EST. GFR  (AFRICAN AMERICAN): >60 ML/MIN/1.73 M^2
EST. GFR  (NON AFRICAN AMERICAN): >60 ML/MIN/1.73 M^2
GLUCOSE SERPL-MCNC: 113 MG/DL
HCT VFR BLD AUTO: 39 %
HGB BLD-MCNC: 11.2 G/DL
IMM GRANULOCYTES # BLD AUTO: 0.01 K/UL
IMM GRANULOCYTES NFR BLD AUTO: 0.1 %
LYMPHOCYTES # BLD AUTO: 1.6 K/UL
LYMPHOCYTES NFR BLD: 24.3 %
MCH RBC QN AUTO: 23.5 PG
MCHC RBC AUTO-ENTMCNC: 28.7 G/DL
MCV RBC AUTO: 82 FL
MONOCYTES # BLD AUTO: 0.4 K/UL
MONOCYTES NFR BLD: 6.1 %
NEUTROPHILS # BLD AUTO: 4.5 K/UL
NEUTROPHILS NFR BLD: 67.9 %
NRBC BLD-RTO: 0 /100 WBC
PLATELET # BLD AUTO: 387 K/UL
PMV BLD AUTO: 11.1 FL
POTASSIUM SERPL-SCNC: 4.5 MMOL/L
PROT SERPL-MCNC: 7.5 G/DL
RBC # BLD AUTO: 4.76 M/UL
SODIUM SERPL-SCNC: 138 MMOL/L
WBC # BLD AUTO: 6.68 K/UL

## 2018-11-29 PROCEDURE — 36415 COLL VENOUS BLD VENIPUNCTURE: CPT | Mod: PO

## 2018-11-29 PROCEDURE — 87522 HEPATITIS C REVRS TRNSCRPJ: CPT

## 2018-11-29 PROCEDURE — 87517 HEPATITIS B DNA QUANT: CPT

## 2018-11-29 PROCEDURE — 85025 COMPLETE CBC W/AUTO DIFF WBC: CPT

## 2018-11-29 PROCEDURE — 80053 COMPREHEN METABOLIC PANEL: CPT

## 2018-11-30 ENCOUNTER — PATIENT MESSAGE (OUTPATIENT)
Dept: HEPATOLOGY | Facility: CLINIC | Age: 38
End: 2018-11-30

## 2018-12-03 ENCOUNTER — TELEPHONE (OUTPATIENT)
Dept: HEPATOLOGY | Facility: CLINIC | Age: 38
End: 2018-12-03

## 2018-12-03 DIAGNOSIS — D64.9 ANEMIA, UNSPECIFIED TYPE: Primary | ICD-10-CM

## 2018-12-03 LAB
HBV DNA SERPL NAA+PROBE-ACNC: <10 IU/ML
HBV DNA SERPL NAA+PROBE-LOG IU: <1 LOG (10) IU/ML
HBV DNA SERPL QL NAA+PROBE: NOT DETECTED
HCV RNA SERPL NAA+PROBE-LOG IU: <1.08 LOG (10) IU/ML
HCV RNA SERPL QL NAA+PROBE: NOT DETECTED IU/ML
HCV RNA SPEC NAA+PROBE-ACNC: <12 IU/ML

## 2018-12-03 NOTE — TELEPHONE ENCOUNTER
HCV LAB REVIEW  Completed 8 weeks of Mavyret, 11/18  G2, tx naive  F0-1    Pertinent labs:  11/29  CMP stable  HCV neg  HBV neg  CBC - mild anemia    [facility] message sent to pt:  Labs reviewed  Patient should be finished HCV treatment now.   There is a small chance the virus can return over the next 3 months. We will monitor blood for this.        Next labs due  - CMP, HCV RNA, HBV DNA - SVR12 - 2/11 -- will recheck Fe studies

## 2019-05-09 ENCOUNTER — EPISODE CHANGES (OUTPATIENT)
Dept: HEPATOLOGY | Facility: CLINIC | Age: 39
End: 2019-05-09

## 2019-05-09 ENCOUNTER — TELEPHONE (OUTPATIENT)
Dept: HEPATOLOGY | Facility: CLINIC | Age: 39
End: 2019-05-09

## 2019-05-09 NOTE — TELEPHONE ENCOUNTER
Patient did not have SVR 12 lab drawn on 2/11.  I spoke her and draw moved to 5/14; reminder notice mailed.

## 2019-08-16 ENCOUNTER — TELEPHONE (OUTPATIENT)
Dept: HEPATOLOGY | Facility: CLINIC | Age: 39
End: 2019-08-16

## 2019-08-16 ENCOUNTER — EPISODE CHANGES (OUTPATIENT)
Dept: HEPATOLOGY | Facility: CLINIC | Age: 39
End: 2019-08-16

## 2019-08-16 NOTE — TELEPHONE ENCOUNTER
S/p HCV rx w/ mavyret  Pt did not have SVR12 labs  Spoke to pt - r/s labs wed 8/21 at her request

## 2019-08-21 ENCOUNTER — LAB VISIT (OUTPATIENT)
Dept: LAB | Facility: HOSPITAL | Age: 39
End: 2019-08-21
Attending: PHYSICIAN ASSISTANT
Payer: MEDICAID

## 2019-08-21 DIAGNOSIS — D64.9 ANEMIA, UNSPECIFIED TYPE: ICD-10-CM

## 2019-08-21 DIAGNOSIS — B18.2 CHRONIC HEPATITIS C WITHOUT HEPATIC COMA: ICD-10-CM

## 2019-08-21 LAB
ALBUMIN SERPL BCP-MCNC: 4 G/DL (ref 3.5–5.2)
ALP SERPL-CCNC: 42 U/L (ref 55–135)
ALT SERPL W/O P-5'-P-CCNC: 12 U/L (ref 10–44)
ANION GAP SERPL CALC-SCNC: 9 MMOL/L (ref 8–16)
AST SERPL-CCNC: 15 U/L (ref 10–40)
BILIRUB SERPL-MCNC: 0.4 MG/DL (ref 0.1–1)
BUN SERPL-MCNC: 11 MG/DL (ref 6–20)
CALCIUM SERPL-MCNC: 10.1 MG/DL (ref 8.7–10.5)
CHLORIDE SERPL-SCNC: 106 MMOL/L (ref 95–110)
CO2 SERPL-SCNC: 27 MMOL/L (ref 23–29)
CREAT SERPL-MCNC: 0.9 MG/DL (ref 0.5–1.4)
EST. GFR  (AFRICAN AMERICAN): >60 ML/MIN/1.73 M^2
EST. GFR  (NON AFRICAN AMERICAN): >60 ML/MIN/1.73 M^2
FERRITIN SERPL-MCNC: 21 NG/ML (ref 20–300)
GLUCOSE SERPL-MCNC: 91 MG/DL (ref 70–110)
IRON SERPL-MCNC: 38 UG/DL (ref 30–160)
POTASSIUM SERPL-SCNC: 4.2 MMOL/L (ref 3.5–5.1)
PROT SERPL-MCNC: 7.3 G/DL (ref 6–8.4)
SATURATED IRON: 10 % (ref 20–50)
SODIUM SERPL-SCNC: 142 MMOL/L (ref 136–145)
TOTAL IRON BINDING CAPACITY: 388 UG/DL (ref 250–450)
TRANSFERRIN SERPL-MCNC: 262 MG/DL (ref 200–375)

## 2019-08-21 PROCEDURE — 80053 COMPREHEN METABOLIC PANEL: CPT

## 2019-08-21 PROCEDURE — 82728 ASSAY OF FERRITIN: CPT

## 2019-08-21 PROCEDURE — 83540 ASSAY OF IRON: CPT

## 2019-08-21 PROCEDURE — 36415 COLL VENOUS BLD VENIPUNCTURE: CPT | Mod: PO

## 2019-08-21 PROCEDURE — 87522 HEPATITIS C REVRS TRNSCRPJ: CPT

## 2019-08-23 LAB
HCV RNA SERPL NAA+PROBE-LOG IU: <1.08 LOG (10) IU/ML
HCV RNA SERPL QL NAA+PROBE: NOT DETECTED IU/ML
HCV RNA SPEC NAA+PROBE-ACNC: <12 IU/ML

## 2019-08-24 ENCOUNTER — PATIENT MESSAGE (OUTPATIENT)
Dept: HEPATOLOGY | Facility: CLINIC | Age: 39
End: 2019-08-24

## 2019-08-26 ENCOUNTER — TELEPHONE (OUTPATIENT)
Dept: HEPATOLOGY | Facility: CLINIC | Age: 39
End: 2019-08-26

## 2019-08-26 DIAGNOSIS — Z86.19 HISTORY OF HEPATITIS C: ICD-10-CM

## 2019-08-26 NOTE — TELEPHONE ENCOUNTER
HCV LAB REVIEW  Completed 8 weeks of Mavyret, 11/18/18  G2, tx naive  F0-1    Pertinent labs:  8/21/19  CMP stable  HCV neg  Fe studies - still low Fe sat 10% but improved compared to prior labs    Spoke to pt:  These labs document SVR (cure)  following successful HCV treatment with Mavyret   This is consistent with a cure. Relapse is not expected.   No immunity is conferred and patient could be reinfected.     Reviewed continued Fe deficiency and fatigue despite oral Fe supplement  S/p hysterectomy  Saw GI and had EGD. Colon was discussed but not done  Advised to return to GI to see if colonoscopy is needed  If still Fe deficient may need to then see hematology    Pt verbalized understanding

## 2019-11-08 ENCOUNTER — HOSPITAL ENCOUNTER (EMERGENCY)
Facility: HOSPITAL | Age: 39
Discharge: HOME | End: 2019-11-08
Attending: EMERGENCY MEDICINE
Payer: MEDICAID

## 2019-11-08 VITALS
SYSTOLIC BLOOD PRESSURE: 108 MMHG | RESPIRATION RATE: 18 BRPM | BODY MASS INDEX: 25.03 KG/M2 | DIASTOLIC BLOOD PRESSURE: 68 MMHG | OXYGEN SATURATION: 100 % | HEART RATE: 73 BPM | HEIGHT: 69 IN | TEMPERATURE: 98 F | WEIGHT: 169 LBS

## 2019-11-08 DIAGNOSIS — G44.039 EPISODIC PAROXYSMAL HEMICRANIA, NOT INTRACTABLE: Primary | ICD-10-CM

## 2019-11-08 LAB
ALBUMIN SERPL BCP-MCNC: 4.7 G/DL (ref 3.5–5.2)
ALP SERPL-CCNC: 41 U/L (ref 55–135)
ALT SERPL W/O P-5'-P-CCNC: 17 U/L (ref 10–44)
ANION GAP SERPL CALC-SCNC: 9 MMOL/L (ref 8–16)
AST SERPL-CCNC: 17 U/L (ref 10–40)
BASOPHILS # BLD AUTO: 0.06 K/UL (ref 0–0.2)
BASOPHILS NFR BLD: 0.7 % (ref 0–1.9)
BILIRUB SERPL-MCNC: 0.2 MG/DL (ref 0.1–1)
BILIRUB UR QL STRIP: NEGATIVE
BUN SERPL-MCNC: 8 MG/DL (ref 6–20)
CALCIUM SERPL-MCNC: 9.8 MG/DL (ref 8.7–10.5)
CHLORIDE SERPL-SCNC: 108 MMOL/L (ref 95–110)
CLARITY UR: CLEAR
CO2 SERPL-SCNC: 27 MMOL/L (ref 23–29)
COLOR UR: YELLOW
CREAT SERPL-MCNC: 1.1 MG/DL (ref 0.5–1.4)
DIFFERENTIAL METHOD: ABNORMAL
EOSINOPHIL # BLD AUTO: 0.1 K/UL (ref 0–0.5)
EOSINOPHIL NFR BLD: 0.9 % (ref 0–8)
ERYTHROCYTE [DISTWIDTH] IN BLOOD BY AUTOMATED COUNT: 15.3 % (ref 11.5–14.5)
EST. GFR  (AFRICAN AMERICAN): >60 ML/MIN/1.73 M^2
EST. GFR  (NON AFRICAN AMERICAN): >60 ML/MIN/1.73 M^2
GLUCOSE SERPL-MCNC: 101 MG/DL (ref 70–110)
GLUCOSE UR QL STRIP: NEGATIVE
HCT VFR BLD AUTO: 40.9 % (ref 37–48.5)
HGB BLD-MCNC: 12.5 G/DL (ref 12–16)
HGB UR QL STRIP: NEGATIVE
IMM GRANULOCYTES # BLD AUTO: 0.02 K/UL (ref 0–0.04)
KETONES UR QL STRIP: NEGATIVE
LEUKOCYTE ESTERASE UR QL STRIP: NEGATIVE
LYMPHOCYTES # BLD AUTO: 2.2 K/UL (ref 1–4.8)
LYMPHOCYTES NFR BLD: 25.4 % (ref 18–48)
MCH RBC QN AUTO: 25.7 PG (ref 27–31)
MCHC RBC AUTO-ENTMCNC: 30.6 G/DL (ref 32–36)
MCV RBC AUTO: 84 FL (ref 82–98)
MONOCYTES # BLD AUTO: 0.5 K/UL (ref 0.3–1)
MONOCYTES NFR BLD: 5.6 % (ref 4–15)
NEUTROPHILS # BLD AUTO: 5.7 K/UL (ref 1.8–7.7)
NEUTROPHILS NFR BLD: 67.2 % (ref 38–73)
NITRITE UR QL STRIP: NEGATIVE
NRBC BLD-RTO: 0 /100 WBC
PH UR STRIP: 8 [PH] (ref 5–8)
PLATELET # BLD AUTO: 379 K/UL (ref 150–350)
PMV BLD AUTO: 10.6 FL (ref 9.2–12.9)
POTASSIUM SERPL-SCNC: 4.4 MMOL/L (ref 3.5–5.1)
PROT SERPL-MCNC: 8 G/DL (ref 6–8.4)
PROT UR QL STRIP: NEGATIVE
RBC # BLD AUTO: 4.87 M/UL (ref 4–5.4)
SODIUM SERPL-SCNC: 144 MMOL/L (ref 136–145)
SP GR UR STRIP: 1.01 (ref 1–1.03)
URN SPEC COLLECT METH UR: NORMAL
UROBILINOGEN UR STRIP-ACNC: NEGATIVE EU/DL
WBC # BLD AUTO: 8.52 K/UL (ref 3.9–12.7)

## 2019-11-08 PROCEDURE — 63600175 PHARM REV CODE 636 W HCPCS: Performed by: EMERGENCY MEDICINE

## 2019-11-08 PROCEDURE — 99284 EMERGENCY DEPT VISIT MOD MDM: CPT | Mod: 25

## 2019-11-08 PROCEDURE — 81003 URINALYSIS AUTO W/O SCOPE: CPT

## 2019-11-08 PROCEDURE — 96375 TX/PRO/DX INJ NEW DRUG ADDON: CPT

## 2019-11-08 PROCEDURE — 36415 COLL VENOUS BLD VENIPUNCTURE: CPT

## 2019-11-08 PROCEDURE — 96374 THER/PROPH/DIAG INJ IV PUSH: CPT

## 2019-11-08 PROCEDURE — 80053 COMPREHEN METABOLIC PANEL: CPT

## 2019-11-08 PROCEDURE — 85025 COMPLETE CBC W/AUTO DIFF WBC: CPT

## 2019-11-08 RX ORDER — SUMATRIPTAN 50 MG/1
50 TABLET, FILM COATED ORAL
COMMUNITY
End: 2023-11-22

## 2019-11-08 RX ORDER — LURASIDONE HYDROCHLORIDE 40 MG/1
40 TABLET, FILM COATED ORAL DAILY
COMMUNITY
End: 2023-11-22

## 2019-11-08 RX ORDER — DIAZEPAM 10 MG/1
10 TABLET ORAL DAILY
COMMUNITY
End: 2023-11-22

## 2019-11-08 RX ORDER — DIPHENHYDRAMINE HYDROCHLORIDE 50 MG/ML
25 INJECTION INTRAMUSCULAR; INTRAVENOUS
Status: COMPLETED | OUTPATIENT
Start: 2019-11-08 | End: 2019-11-08

## 2019-11-08 RX ORDER — KETOROLAC TROMETHAMINE 30 MG/ML
10 INJECTION, SOLUTION INTRAMUSCULAR; INTRAVENOUS
Status: COMPLETED | OUTPATIENT
Start: 2019-11-08 | End: 2019-11-08

## 2019-11-08 RX ORDER — PROCHLORPERAZINE EDISYLATE 5 MG/ML
10 INJECTION INTRAMUSCULAR; INTRAVENOUS
Status: COMPLETED | OUTPATIENT
Start: 2019-11-08 | End: 2019-11-08

## 2019-11-08 RX ADMIN — DIPHENHYDRAMINE HYDROCHLORIDE 25 MG: 50 INJECTION INTRAMUSCULAR; INTRAVENOUS at 08:11

## 2019-11-08 RX ADMIN — KETOROLAC TROMETHAMINE 10 MG: 30 INJECTION, SOLUTION INTRAMUSCULAR at 08:11

## 2019-11-08 RX ADMIN — PROCHLORPERAZINE EDISYLATE 10 MG: 5 INJECTION INTRAMUSCULAR; INTRAVENOUS at 08:11

## 2019-11-09 NOTE — ED NOTES
Assumed care:  Keily Moreno is awake, alert and oriented x 3, skin warm and dry, in NAD with family at bedside.  Patient with history of migraines, CO headache with disorientation that started this morning, also CO nausea.    Patient identifiers for Keily Moreno checked and correct.  LOC:  Keily Moreno is awake, alert, and aware of environment with an appropriate affect. She is oriented x 3 and speaking appropriately.  APPEARANCE:  She is resting comfortably and in no acute distress. She is clean and well groomed, patient's clothing is properly fastened.  SKIN:  The skin is warm and dry. She has normal skin turgor and moist mucus membranes. Skin is intact; no bruising or breakdown noted.  MUSCULOSKELETAL:  She is moving all extremities well, no obvious deformities noted. Pulses intact.   RESPIRATORY:  Airway is open and patent. Respirations are spontaneous and non-labored with normal effort and rate.  CARDIAC:  She has a normal rate and rhythm. No peripheral edema noted. Capillary refill < 3 seconds.  ABDOMEN:  No distention noted.  Soft and non-tender upon palpation.  Intermitting nausea.  NEUROLOGICAL:  PERRL. Facial expression is symmetrical. Hand grasps are equal bilaterally. Normal sensation in all extremities when touched with finger.  HA with disorientation that has resolved  Allergies reported:    Review of patient's allergies indicates:   Allergen Reactions    Paxil [paroxetine hcl] Anaphylaxis    Paxil [paroxetine hcl] Other (See Comments)     OTHER NOTES:

## 2019-11-09 NOTE — ED PROVIDER NOTES
Encounter Date: 2019    SCRIBE #1 NOTE: I, Karie Coleman, am scribing for, and in the presence of, Gopi Ferreira MD.       History     Chief Complaint   Patient presents with    Dizziness     Pt thinks had panic attack earlier, still dizzy     Headache     today        Time seen by provider: 7:08 PM on 2019    Keily Moreno is a 39 y.o. female with a PMHx of migraines and anxiety who presents to the ED with an onset of a HA with dizziness and disorientation that started this afternoon. Patient reports symptoms started while at work and caused her to have an anxiety attack. Patient also reports tremors and face tingling. She reports previous similar HA but endorses that the dizziness and disorientation were new to this episode. Patient endorses taking a half a Valium after work for her anxiety. Patient reports HA still persists. Patient is a current smoker. The patient denies any other symptoms at this time. No pertinent PSHx.      The history is provided by the patient.     Review of patient's allergies indicates:   Allergen Reactions    Paxil [paroxetine hcl] Anaphylaxis    Paxil [paroxetine hcl] Other (See Comments)     Past Medical History:   Diagnosis Date    Anxiety     Depression     Encounter for blood transfusion , ,     History of cervical cancer 2006    diagnosed , hysterectomy     History of hepatitis C, s/p successful Rx w/ SVR (cure)      Migraine     previously on topamax, imitrex, fioricet    Migraine headache     Neck pain     Seizures 2009    occurred during benzo detox     Past Surgical History:   Procedure Laterality Date     SECTION       SECTION, CLASSIC      HYSTERECTOMY      MIDDLE EAR SURGERY      TONSILLECTOMY       Family History   Problem Relation Age of Onset    Hypertension Mother     Depression Mother     Alcohol abuse Mother     Miscarriages / Stillbirths Mother     Depression Father      Hypertension Father     Hyperlipidemia Father     Alcohol abuse Father     Asthma Daughter      Social History     Tobacco Use    Smoking status: Former Smoker     Packs/day: 1.00     Years: 10.00     Pack years: 10.00     Start date: 1995     Last attempt to quit: 2017     Years since quittin.9   Substance Use Topics    Alcohol use: No    Drug use: Yes     Frequency: 1.0 times per week     Types: Heroin     Review of Systems   Constitutional: Negative for fever.   HENT: Negative for sore throat.    Respiratory: Negative for shortness of breath.    Cardiovascular: Negative for chest pain.   Gastrointestinal: Negative for nausea.   Genitourinary: Negative for dysuria.   Musculoskeletal: Negative for back pain.   Skin: Negative for rash.   Neurological: Positive for dizziness, tremors and headaches. Negative for weakness.        + Disorientation  + Facial tingling   Hematological: Does not bruise/bleed easily.   Psychiatric/Behavioral: The patient is nervous/anxious.        Physical Exam     Initial Vitals [19 1855]   BP Pulse Resp Temp SpO2   131/67 89 18 98.1 °F (36.7 °C) 99 %      MAP       --         Physical Exam    Nursing note and vitals reviewed.  Constitutional: She appears well-developed and well-nourished.  Non-toxic appearance. No distress.   HENT:   Head: Normocephalic and atraumatic.   Eyes: EOM are normal. Pupils are equal, round, and reactive to light.   Neck: Normal range of motion. Neck supple. No neck rigidity. No JVD present.   Cardiovascular: Normal rate, regular rhythm, normal heart sounds and intact distal pulses. Exam reveals no gallop and no friction rub.    No murmur heard.  Pulmonary/Chest: Breath sounds normal. She has no wheezes. She has no rhonchi. She has no rales.   Abdominal: Soft. Bowel sounds are normal. She exhibits no distension. There is no tenderness. There is no rebound and no guarding.   Musculoskeletal: Normal range of motion.   Neurological: She is  alert and oriented to person, place, and time. She has normal strength and normal reflexes. No cranial nerve deficit or sensory deficit. She exhibits normal muscle tone. Coordination normal.   Cranial nerves II through XII grossly intact. Finger to nose normal. Tone normal. Sens intact to light touch. No drift. No disdiadochokinesia. Strength 5/5 bilaterally upper and lower. Normal gait. No Romberg. Speech and cognition is normal.  No focal neurologic deficit.   Skin: Skin is warm and dry.   Psychiatric: Her speech is normal and behavior is normal. Her mood appears anxious. She is not actively hallucinating.   Tearful.         ED Course   Procedures  Labs Reviewed   CBC W/ AUTO DIFFERENTIAL - Abnormal; Notable for the following components:       Result Value    Mean Corpuscular Hemoglobin 25.7 (*)     Mean Corpuscular Hemoglobin Conc 30.6 (*)     RDW 15.3 (*)     Platelets 379 (*)     All other components within normal limits   COMPREHENSIVE METABOLIC PANEL - Abnormal; Notable for the following components:    Alkaline Phosphatase 41 (*)     All other components within normal limits   URINALYSIS, REFLEX TO URINE CULTURE    Narrative:     Preferred Collection Type->Urine, Clean Catch          Imaging Results          CT Head Without Contrast (Final result)  Result time 11/08/19 20:00:30    Final result by Lashaun Calderon MD (11/08/19 20:00:30)                 Impression:      No acute infarct or hemorrhage or mass or fracture.      Electronically signed by: Lashaun Calderon  Date:    11/08/2019  Time:    20:00             Narrative:    EXAMINATION:  CT HEAD WITHOUT CONTRAST    CLINICAL HISTORY:  Confusion/delirium, altered LOC, unexplained;    TECHNIQUE:  Low dose axial images were obtained through the head.  Coronal and sagittal reformations were also performed. Contrast was not administered.    COMPARISON:  10/24/2014 head CT    FINDINGS:  The overall gray-white interface and hemispherical morphology appears normal.   There is no midline shift or mass effect.  The ventricles and basal cisterns and sulci are proportionally sized and mildly prominent for age, similar with 10/24/2014.  The corpus callosum appears normal.  The pituitary and sella turcica are probably normal.    No acute infarct or hemorrhage or mass or vasogenic edema.  No subdural collection.    No skull fracture.  No soft tissue swelling.  The visualized orbits and globes and lenses appear normal.  No scalp edema.  The visualized paranasal sinuses and mastoid sinuses middle ears appear normal.                                 Medical Decision Making:   History:   Old Medical Records: I decided to obtain old medical records.  Initial Assessment:   Patient is a very pleasant 39-year-old woman with a history of migraines and anxiety who presents emergency department for recurrent headaches.  She states her headaches are sharp in nature in in different parts of her head.  Currently she experienced see it in the frontoparietal area.  She denies any vision changes but occasionally feels tingling in her face and recently has been having episodes of feelings of disorientation with the headaches.  On examination she is afebrile alert and oriented x3 and in no acute distress. Her neurological exam is normal with a negative Romberg and tandem gait.  She is tearful and anxious appearing.  She has normal temporal artery pulsations.  She has no symptoms of claudication.  CT head performed shows no acute infarct, hemorrhage or mass. I have very low suspicion for sentinel bleed/subarachnoid hemorrhage or CVA.  Patient has been prescribed Imitrex for her headaches classified as migraines but she has not taken it for this episode today.  She is given Toradol, Benadryl and Compazine in the ED with significant improvement in her symptoms.  Patient will be discharged with Neurology follow-up.  Return precautions discussed for worsening symptoms.  Clinical Tests:   Lab Tests: Ordered and  Reviewed  Radiological Study: Ordered and Reviewed            Scribe Attestation:   Scribe #1: I performed the above scribed service and the documentation accurately describes the services I performed. I attest to the accuracy of the note.        I, Jhon Puentes, personally performed the services described in this documentation. All medical record entries made by the scribe were at my direction and in my presence.  I have reviewed the chart and agree that the record reflects my personal performance and is accurate and complete. Gopi Ferreira MD.                   Clinical Impression:       ICD-10-CM ICD-9-CM   1. Episodic paroxysmal hemicrania, not intractable G44.039 339.03         Disposition:   Disposition: Discharged  Condition: Stable                     Gopi Ferreira MD  11/09/19 0051

## 2020-05-27 DIAGNOSIS — G43.109 COMPLICATED MIGRAINE: Primary | ICD-10-CM

## 2020-11-09 DIAGNOSIS — K21.9 ESOPHAGEAL REFLUX: Primary | ICD-10-CM

## 2020-11-09 DIAGNOSIS — R19.7 DIARRHEA: ICD-10-CM

## 2020-11-09 DIAGNOSIS — R10.13 EPIGASTRIC PAIN: ICD-10-CM

## 2020-11-09 DIAGNOSIS — R11.2 NAUSEA WITH VOMITING: ICD-10-CM

## 2021-01-20 DIAGNOSIS — G45.9 INTERMITTENT CEREBRAL ISCHEMIA: ICD-10-CM

## 2021-01-20 DIAGNOSIS — R20.0 TACTILE ANESTHESIA: ICD-10-CM

## 2021-01-20 DIAGNOSIS — G43.109 CLASSICAL MIGRAINE: Primary | ICD-10-CM

## 2021-02-05 DIAGNOSIS — M20.12 ACQUIRED HALLUX VALGUS, LEFT: ICD-10-CM

## 2021-02-05 DIAGNOSIS — M20.11 ACQUIRED HALLUX VALGUS OF RIGHT FOOT: Primary | ICD-10-CM

## 2021-04-29 ENCOUNTER — PATIENT MESSAGE (OUTPATIENT)
Dept: RESEARCH | Facility: HOSPITAL | Age: 41
End: 2021-04-29

## 2021-12-06 DIAGNOSIS — M54.50 LOW BACK PAIN: Primary | ICD-10-CM

## 2022-03-16 DIAGNOSIS — G43.109 COMPLICATED MIGRAINE: ICD-10-CM

## 2022-03-16 DIAGNOSIS — G89.29 CHRONIC INTRACTABLE PAIN: Primary | ICD-10-CM

## 2022-03-16 DIAGNOSIS — R11.2 NAUSEA WITH VOMITING: ICD-10-CM

## 2022-03-17 DIAGNOSIS — G43.109 COMPLICATED MIGRAINE: ICD-10-CM

## 2022-03-17 DIAGNOSIS — K62.89 CHRONIC IDIOPATHIC ANAL PAIN: Primary | ICD-10-CM

## 2022-03-17 DIAGNOSIS — G89.29 CHRONIC IDIOPATHIC ANAL PAIN: Primary | ICD-10-CM

## 2022-03-17 DIAGNOSIS — R11.2 NAUSEA WITH VOMITING: ICD-10-CM

## 2022-03-28 ENCOUNTER — HOSPITAL ENCOUNTER (OUTPATIENT)
Dept: RADIOLOGY | Facility: HOSPITAL | Age: 42
Discharge: HOME | End: 2022-03-28
Attending: NURSE PRACTITIONER
Payer: MEDICAID

## 2022-03-28 DIAGNOSIS — G43.109 COMPLICATED MIGRAINE: ICD-10-CM

## 2022-03-28 DIAGNOSIS — K62.89 CHRONIC IDIOPATHIC ANAL PAIN: ICD-10-CM

## 2022-03-28 DIAGNOSIS — R11.2 NAUSEA WITH VOMITING: ICD-10-CM

## 2022-03-28 DIAGNOSIS — G89.29 CHRONIC INTRACTABLE PAIN: ICD-10-CM

## 2022-03-28 DIAGNOSIS — G89.29 CHRONIC IDIOPATHIC ANAL PAIN: ICD-10-CM

## 2022-04-06 ENCOUNTER — HOSPITAL ENCOUNTER (OUTPATIENT)
Dept: RADIOLOGY | Facility: HOSPITAL | Age: 42
Discharge: HOME | End: 2022-04-06
Attending: NURSE PRACTITIONER
Payer: MEDICAID

## 2022-04-06 DIAGNOSIS — M54.50 LOW BACK PAIN: ICD-10-CM

## 2022-04-06 PROCEDURE — 70551 MRI BRAIN STEM W/O DYE: CPT | Mod: TC,PO

## 2022-04-06 PROCEDURE — 70544 MR ANGIOGRAPHY HEAD W/O DYE: CPT | Mod: TC,PO,59

## 2022-04-06 PROCEDURE — 72110 X-RAY EXAM L-2 SPINE 4/>VWS: CPT | Mod: TC,PO

## 2022-04-19 DIAGNOSIS — G43.109 COMPLICATED MIGRAINE: ICD-10-CM

## 2022-04-19 DIAGNOSIS — R11.2 NAUSEA WITH VOMITING: ICD-10-CM

## 2022-04-19 DIAGNOSIS — G89.29 CHRONIC INTRACTABLE PAIN: Primary | ICD-10-CM

## 2022-04-19 DIAGNOSIS — R10.30 PAIN IN THE GROIN: Primary | ICD-10-CM

## 2022-04-22 DIAGNOSIS — I67.1 ANEURYSM OF INTRACRANIAL PORTION OF LEFT INTERNAL CAROTID ARTERY: Primary | ICD-10-CM

## 2022-05-02 ENCOUNTER — HOSPITAL ENCOUNTER (OUTPATIENT)
Dept: RADIOLOGY | Facility: HOSPITAL | Age: 42
Discharge: HOME | End: 2022-05-02
Attending: NURSE PRACTITIONER
Payer: MEDICAID

## 2022-05-02 ENCOUNTER — PATIENT MESSAGE (OUTPATIENT)
Dept: FAMILY MEDICINE | Facility: CLINIC | Age: 42
End: 2022-05-02

## 2022-05-02 DIAGNOSIS — I67.1 ANEURYSM OF INTRACRANIAL PORTION OF LEFT INTERNAL CAROTID ARTERY: ICD-10-CM

## 2022-05-02 PROCEDURE — 25500020 PHARM REV CODE 255: Mod: PO | Performed by: NURSE PRACTITIONER

## 2022-05-02 PROCEDURE — 70496 CT ANGIOGRAPHY HEAD: CPT | Mod: TC,PO

## 2022-05-02 RX ADMIN — IOHEXOL 100 ML: 350 INJECTION, SOLUTION INTRAVENOUS at 01:05

## 2022-05-10 ENCOUNTER — OFFICE VISIT (OUTPATIENT)
Dept: NEUROSURGERY | Facility: CLINIC | Age: 42
End: 2022-05-10
Payer: MEDICAID

## 2022-05-10 VITALS
DIASTOLIC BLOOD PRESSURE: 73 MMHG | HEART RATE: 62 BPM | RESPIRATION RATE: 18 BRPM | HEIGHT: 69 IN | SYSTOLIC BLOOD PRESSURE: 109 MMHG | WEIGHT: 169.06 LBS | BODY MASS INDEX: 25.04 KG/M2

## 2022-05-10 DIAGNOSIS — R51.9 PERSISTENT HEADACHES: Primary | ICD-10-CM

## 2022-05-10 PROCEDURE — 99205 PR OFFICE/OUTPT VISIT, NEW, LEVL V, 60-74 MIN: ICD-10-PCS | Mod: S$PBB,,, | Performed by: NEUROLOGICAL SURGERY

## 2022-05-10 PROCEDURE — 3074F PR MOST RECENT SYSTOLIC BLOOD PRESSURE < 130 MM HG: ICD-10-PCS | Mod: CPTII,,, | Performed by: NEUROLOGICAL SURGERY

## 2022-05-10 PROCEDURE — 3078F DIAST BP <80 MM HG: CPT | Mod: CPTII,,, | Performed by: NEUROLOGICAL SURGERY

## 2022-05-10 PROCEDURE — 1159F MED LIST DOCD IN RCRD: CPT | Mod: CPTII,,, | Performed by: NEUROLOGICAL SURGERY

## 2022-05-10 PROCEDURE — 99205 OFFICE O/P NEW HI 60 MIN: CPT | Mod: S$PBB,,, | Performed by: NEUROLOGICAL SURGERY

## 2022-05-10 PROCEDURE — 3078F PR MOST RECENT DIASTOLIC BLOOD PRESSURE < 80 MM HG: ICD-10-PCS | Mod: CPTII,,, | Performed by: NEUROLOGICAL SURGERY

## 2022-05-10 PROCEDURE — 3074F SYST BP LT 130 MM HG: CPT | Mod: CPTII,,, | Performed by: NEUROLOGICAL SURGERY

## 2022-05-10 PROCEDURE — 1159F PR MEDICATION LIST DOCUMENTED IN MEDICAL RECORD: ICD-10-PCS | Mod: CPTII,,, | Performed by: NEUROLOGICAL SURGERY

## 2022-05-10 PROCEDURE — 3008F PR BODY MASS INDEX (BMI) DOCUMENTED: ICD-10-PCS | Mod: CPTII,,, | Performed by: NEUROLOGICAL SURGERY

## 2022-05-10 PROCEDURE — 3008F BODY MASS INDEX DOCD: CPT | Mod: CPTII,,, | Performed by: NEUROLOGICAL SURGERY

## 2022-05-10 RX ORDER — ESTRADIOL 0.1 MG/G
CREAM VAGINAL
COMMUNITY
Start: 2022-04-13 | End: 2023-11-22

## 2022-05-10 RX ORDER — MEDROXYPROGESTERONE ACETATE 10 MG/1
10 TABLET ORAL DAILY
COMMUNITY
Start: 2022-04-13 | End: 2023-11-22

## 2022-05-10 RX ORDER — MELOXICAM 7.5 MG/1
TABLET ORAL
COMMUNITY
Start: 2022-04-13 | End: 2023-11-22

## 2022-05-10 RX ORDER — CLONAZEPAM 1 MG/1
TABLET ORAL
COMMUNITY
Start: 2019-09-02

## 2022-05-10 RX ORDER — ONDANSETRON 4 MG/1
TABLET, ORALLY DISINTEGRATING ORAL
COMMUNITY
Start: 2022-04-20 | End: 2023-11-22

## 2022-05-10 RX ORDER — LIDOCAINE HYDROCHLORIDE 20 MG/ML
JELLY TOPICAL
COMMUNITY
Start: 2022-04-13 | End: 2023-11-22

## 2022-05-10 RX ORDER — PROMETHAZINE HYDROCHLORIDE 25 MG/1
TABLET ORAL
COMMUNITY
End: 2023-11-22

## 2022-05-10 RX ORDER — RIMEGEPANT SULFATE 75 MG/75MG
TABLET, ORALLY DISINTEGRATING ORAL
COMMUNITY
Start: 2022-03-04
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
## 2022-06-15 DIAGNOSIS — R10.30 PAIN IN THE GROIN: ICD-10-CM

## 2022-06-15 DIAGNOSIS — N80.9 ENDOMETRIOTIC CYST: ICD-10-CM

## 2022-06-15 DIAGNOSIS — Z90.711 ACQUIRED ABSENCE OF UTERUS WITH REMAINING CERVICAL STUMP: ICD-10-CM

## 2022-06-15 DIAGNOSIS — N93.9 VAGINAL HEMORRHAGE: Primary | ICD-10-CM

## 2022-06-30 ENCOUNTER — HOSPITAL ENCOUNTER (OUTPATIENT)
Dept: RADIOLOGY | Facility: HOSPITAL | Age: 42
Discharge: HOME | End: 2022-06-30
Attending: STUDENT IN AN ORGANIZED HEALTH CARE EDUCATION/TRAINING PROGRAM
Payer: MEDICAID

## 2022-06-30 DIAGNOSIS — Z90.711 ACQUIRED ABSENCE OF UTERUS WITH REMAINING CERVICAL STUMP: ICD-10-CM

## 2022-06-30 DIAGNOSIS — N93.9 VAGINAL HEMORRHAGE: ICD-10-CM

## 2022-06-30 DIAGNOSIS — N80.9 ENDOMETRIOTIC CYST: ICD-10-CM

## 2022-06-30 DIAGNOSIS — R10.30 PAIN IN THE GROIN: ICD-10-CM

## 2022-06-30 PROCEDURE — 72197 MRI PELVIS W/O & W/DYE: CPT | Mod: TC

## 2022-06-30 PROCEDURE — 25500020 PHARM REV CODE 255: Performed by: STUDENT IN AN ORGANIZED HEALTH CARE EDUCATION/TRAINING PROGRAM

## 2022-06-30 PROCEDURE — A9585 GADOBUTROL INJECTION: HCPCS | Performed by: STUDENT IN AN ORGANIZED HEALTH CARE EDUCATION/TRAINING PROGRAM

## 2022-06-30 RX ORDER — GADOBUTROL 604.72 MG/ML
7.5 INJECTION INTRAVENOUS
Status: COMPLETED | OUTPATIENT
Start: 2022-06-30 | End: 2022-06-30

## 2022-06-30 RX ADMIN — GADOBUTROL 7.5 ML: 604.72 INJECTION INTRAVENOUS at 04:06

## 2023-02-10 DIAGNOSIS — M99.05 SEGMENTAL AND SOMATIC DYSFUNCTION OF PELVIC REGION: Primary | ICD-10-CM

## 2023-04-25 DIAGNOSIS — M54.2 CERVICALGIA: Primary | ICD-10-CM

## 2023-04-25 DIAGNOSIS — Z12.31 ENCOUNTER FOR SCREENING MAMMOGRAM FOR MALIGNANT NEOPLASM OF BREAST: Primary | ICD-10-CM

## 2023-05-05 ENCOUNTER — HOSPITAL ENCOUNTER (OUTPATIENT)
Dept: RADIOLOGY | Facility: HOSPITAL | Age: 43
Discharge: HOME | End: 2023-05-05
Attending: NURSE PRACTITIONER
Payer: MEDICAID

## 2023-05-05 DIAGNOSIS — M54.2 CERVICALGIA: ICD-10-CM

## 2023-05-05 PROCEDURE — 72141 MRI NECK SPINE W/O DYE: CPT | Mod: TC,PO

## 2023-05-08 ENCOUNTER — OFFICE VISIT (OUTPATIENT)
Dept: PULMONOLOGY | Facility: CLINIC | Age: 43
End: 2023-05-08
Payer: MEDICAID

## 2023-05-08 DIAGNOSIS — G47.00 INSOMNIA, UNSPECIFIED TYPE: Primary | ICD-10-CM

## 2023-05-08 NOTE — PROGRESS NOTES
Patient was not seen today. She is not sure who sent her or why. Has a sleep study set for tomorrow. She will follow up after she has sleep study

## 2023-07-14 NOTE — LETTER
June 21, 2018      Kesha Walker, FNP  [address] [facility] [address]           Avinash ashkan - Hepatitis C  [address]  [facility] [address]  Phone: [phone]  Fax: [phone]          Patient: Keily Moreno   MR Number: [id number]   YOB: 1980   Date of Visit: 6/21/2018       Dear Kesha Walker:    Thank you for referring Keily Moreno to me for evaluation. Attached you will find relevant portions of my assessment and plan of care.    If you have questions, please do not hesitate to call me. I look forward to following Keily Moreno along with you.    Sincerely,    Jennifer B. Scheuermann, PA    Enclosure  CC:  No Recipients    If you would like to receive this communication electronically, please contact [email or web address] or [phone] to request more information on [email or web address] access.    For providers and/or their staff who would like to refer a patient to [facility], please contact us through our one-stop-shop provider referral line, [facility] [place], at [phone].    If you feel you have received this communication in error or would no longer like to receive these types of communications, please e-mail [email or web address]          Sotyktu Pregnancy And Lactation Text: There is insufficient data to evaluate whether or not Sotyktu is safe to use during pregnancy.? ?It is not known if Sotyktu passes into breast milk and whether or not it is safe to use when breastfeeding.??

## 2023-11-06 ENCOUNTER — HOSPITAL ENCOUNTER (EMERGENCY)
Facility: HOSPITAL | Age: 43
Discharge: HOME | End: 2023-11-06
Attending: EMERGENCY MEDICINE
Payer: MEDICARE

## 2023-11-06 VITALS
BODY MASS INDEX: 25.03 KG/M2 | DIASTOLIC BLOOD PRESSURE: 66 MMHG | HEIGHT: 69 IN | RESPIRATION RATE: 18 BRPM | OXYGEN SATURATION: 100 % | SYSTOLIC BLOOD PRESSURE: 116 MMHG | TEMPERATURE: 98 F | HEART RATE: 68 BPM | WEIGHT: 169 LBS

## 2023-11-06 DIAGNOSIS — J02.9 PHARYNGITIS, UNSPECIFIED ETIOLOGY: ICD-10-CM

## 2023-11-06 DIAGNOSIS — R51.9 NONINTRACTABLE HEADACHE, UNSPECIFIED CHRONICITY PATTERN, UNSPECIFIED HEADACHE TYPE: ICD-10-CM

## 2023-11-06 DIAGNOSIS — T16.1XXA EAR FOREIGN BODY, RIGHT, INITIAL ENCOUNTER: Primary | ICD-10-CM

## 2023-11-06 LAB
GROUP A STREP, MOLECULAR: NEGATIVE
INFLUENZA A, MOLECULAR: NEGATIVE
INFLUENZA B, MOLECULAR: NEGATIVE
SARS-COV-2 RDRP RESP QL NAA+PROBE: NEGATIVE
SPECIMEN SOURCE: NORMAL

## 2023-11-06 PROCEDURE — 69200 CLEAR OUTER EAR CANAL: CPT | Mod: RT

## 2023-11-06 PROCEDURE — 96361 HYDRATE IV INFUSION ADD-ON: CPT

## 2023-11-06 PROCEDURE — 87502 INFLUENZA DNA AMP PROBE: CPT | Performed by: PHYSICIAN ASSISTANT

## 2023-11-06 PROCEDURE — 96374 THER/PROPH/DIAG INJ IV PUSH: CPT

## 2023-11-06 PROCEDURE — 25000003 PHARM REV CODE 250: Performed by: PHYSICIAN ASSISTANT

## 2023-11-06 PROCEDURE — U0002 COVID-19 LAB TEST NON-CDC: HCPCS | Performed by: PHYSICIAN ASSISTANT

## 2023-11-06 PROCEDURE — 63600175 PHARM REV CODE 636 W HCPCS: Performed by: PHYSICIAN ASSISTANT

## 2023-11-06 PROCEDURE — 87651 STREP A DNA AMP PROBE: CPT | Performed by: PHYSICIAN ASSISTANT

## 2023-11-06 PROCEDURE — 99284 EMERGENCY DEPT VISIT MOD MDM: CPT | Mod: 25

## 2023-11-06 RX ORDER — KETOROLAC TROMETHAMINE 30 MG/ML
15 INJECTION, SOLUTION INTRAMUSCULAR; INTRAVENOUS
Status: COMPLETED | OUTPATIENT
Start: 2023-11-06 | End: 2023-11-06

## 2023-11-06 RX ADMIN — KETOROLAC TROMETHAMINE 15 MG: 30 INJECTION, SOLUTION INTRAMUSCULAR; INTRAVENOUS at 11:11

## 2023-11-06 RX ADMIN — SODIUM CHLORIDE 1000 ML: 9 INJECTION, SOLUTION INTRAVENOUS at 11:11

## 2023-11-07 ENCOUNTER — TELEPHONE (OUTPATIENT)
Dept: OTOLARYNGOLOGY | Facility: CLINIC | Age: 43
End: 2023-11-07
Payer: MEDICARE

## 2023-11-07 NOTE — TELEPHONE ENCOUNTER
----- Message from Tiffany Bell sent at 11/7/2023  7:59 AM CST -----  Regarding: Needs sooner appt  Type:  Sooner Appointment Request    Caller is requesting a sooner appointment.  Caller declined first available appointment listed below.  Caller will not accept being placed on the waitlist and is requesting a message be sent to doctor.    Name of Caller:  Patient  When is the first available appointment?  2024  Symptoms:  pt has foreign body in her ear, the ER reffered herout to ENT to get it removed, ER could not do it  Would the patient rather a call back or a response via [facility]? Call back  Best Call Back Number:  [phone]    Additional Information:

## 2023-11-07 NOTE — ED PROVIDER NOTES
Encounter Date: 2023       History     Chief Complaint   Patient presents with    Foreign Body in Ear     Rt. Ear     Sore Throat     Keily Moreno is a 43 y.o. female presenting for evaluation of sore throat and persistent headache for the last several days.  She has a history of migraine headaches and states this headache feels similar.  She has taken her home medications, with no improvement.  No fever, no chills.  She denies any nasal congestion, cough or chest congestion.  No nausea, vomiting.  In addition, she thinks there is a component of her earring, stuck in her ear canal.  She has not seen an ENT recently.   She has a past medical history of Anxiety, Depression, Encounter for blood transfusion (, , ), History of cervical cancer (), History of hepatitis C, s/p successful Rx w/ SVR (cure) , Migraine, Migraine headache, Neck pain, and Seizures ().      The history is provided by the patient.     Review of patient's allergies indicates:   Allergen Reactions    Paxil [paroxetine hcl] Anaphylaxis    Paxil [paroxetine hcl] Other (See Comments)     Past Medical History:   Diagnosis Date    Anxiety     Depression     Encounter for blood transfusion , ,     History of cervical cancer 2006    diagnosed , hysterectomy     History of hepatitis C, s/p successful Rx w/ SVR (cure)      Migraine     previously on topamax, imitrex, fioricet    Migraine headache     Neck pain     Seizures     occurred during benzo detox     Past Surgical History:   Procedure Laterality Date     SECTION       SECTION, CLASSIC      HYSTERECTOMY      MIDDLE EAR SURGERY      TONSILLECTOMY       Family History   Problem Relation Age of Onset    Hypertension Mother     Depression Mother     Alcohol abuse Mother     Miscarriages / Stillbirths Mother     Depression Father     Hypertension Father     Hyperlipidemia Father     Alcohol abuse Father     Asthma Daughter       Social History     Tobacco Use    Smoking status: Former     Current packs/day: 0.00     Average packs/day: 1 pack/day for 21.9 years (21.9 ttl pk-yrs)     Types: Cigarettes     Start date: 1995     Quit date: 2017     Years since quittin.9   Substance Use Topics    Alcohol use: No    Drug use: Yes     Frequency: 1.0 times per week     Types: Heroin     Review of Systems   Constitutional:  Negative for chills and fever.   HENT:  Positive for ear pain (Foreign body) and sore throat. Negative for facial swelling and trouble swallowing.    Eyes:  Negative for discharge.   Respiratory:  Negative for cough, chest tightness, shortness of breath and wheezing.    Cardiovascular:  Negative for chest pain and palpitations.   Gastrointestinal:  Negative for abdominal pain, diarrhea, nausea and vomiting.   Genitourinary:  Negative for dysuria and hematuria.   Musculoskeletal:  Negative for arthralgias, back pain, joint swelling, myalgias, neck pain and neck stiffness.   Skin:  Negative for color change, pallor, rash and wound.   Neurological:  Positive for headaches. Negative for dizziness, syncope, weakness, light-headedness and numbness.   Hematological:  Does not bruise/bleed easily.   Psychiatric/Behavioral:  The patient is not nervous/anxious.        Physical Exam     Initial Vitals [23 0946]   BP Pulse Resp Temp SpO2   127/60 80 18 97.7 °F (36.5 °C) 98 %      MAP       --         Physical Exam    Nursing note and vitals reviewed.  Constitutional: She appears well-developed and well-nourished. She is not diaphoretic. No distress.   HENT:   Head: Normocephalic and atraumatic.   Right Ear: External ear normal.   Left Ear: External ear normal.   Nasal congestion and rhinorrhea noted.  Mild erythema noted to posterior oropharynx without edema or exudate.  Small, green bead from earring noted in right ear canal, abutting the TM.  No TM perforation noted.  No swelling, exudate or erythema noted to right  ear canal.   Eyes: Conjunctivae and EOM are normal. Pupils are equal, round, and reactive to light.   Neck: Neck supple.   Normal range of motion.  Cardiovascular:  Normal rate, regular rhythm, normal heart sounds and intact distal pulses.           Pulmonary/Chest: Breath sounds normal. No respiratory distress. She has no wheezes. She has no rhonchi. She has no rales.   Musculoskeletal:         General: No tenderness or edema. Normal range of motion.      Cervical back: Normal range of motion and neck supple.     Neurological: She is alert and oriented to person, place, and time. She has normal strength. No cranial nerve deficit or sensory deficit. GCS score is 15. GCS eye subscore is 4. GCS verbal subscore is 5. GCS motor subscore is 6.   No focal neurological deficits noted.  Cranial nerves III-XII grossly intact.  Equal strength and sensation noted to bilateral upper and lower extremities.     Skin: Skin is warm and dry. No rash and no abscess noted. No erythema.   Psychiatric: She has a normal mood and affect.         ED Course   Foreign Body    Date/Time: 11/6/2023 9:43 AM    Performed by: Divya Escalante PA-C  Authorized by: Elio Meneses MD  Body area: ear  Location details: right ear  Anesthesia: local infiltration    Anesthesia:  Local Anesthetic: proparacaine drops    Patient sedated: no  Patient restrained: no  Patient cooperative: yes  Localization method: ENT speculum  Removal mechanism: curette and irrigation  Complexity: simple  0 objects recovered.  Post-procedure assessment: foreign body not removed  Patient tolerance: Patient tolerated the procedure well with no immediate complications      Labs Reviewed   INFLUENZA A & B BY MOLECULAR   GROUP A STREP, MOLECULAR   SARS-COV-2 RNA AMPLIFICATION, QUAL          Imaging Results    None          Medications   sodium chloride 0.9% bolus 1,000 mL 1,000 mL (0 mLs Intravenous Stopped 11/6/23 1219)   ketorolac injection 15 mg (15 mg Intravenous  Given 11/6/23 1118)     Medical Decision Making  Differential diagnosis:  Influenza  Pneumonia  Strep pharyngitis  Meningitis  Viral syndrome  Migraine  Foreign body in ear    Pt emergently evaluated here in the ED.    Influenza, COVID-19 and group a strep are negative.  She may be experiencing symptoms, related to seasonal allergies versus other viral URI.  Foreign body in ear was unable to be removed here in the emergency department despite the use of curette and irrigation.  She is given a dose of IV Toradol in fluids, with minimal symptomatic improvement.  No focal neurological deficits noted on exam.  We do not feel any further imaging or testing is indicated at this time.  Patient does have follow-up with Neurology and recently saw Neurosurgery, with advanced imaging of her head.  She is encouraged to follow back up with them for re-evaluation of the migraine headache.  She voices understanding and is agreeable to the plan.  She is given specific return precautions.  She is referred to ENT for further evaluation and removal of the foreign body.    Amount and/or Complexity of Data Reviewed  Labs: ordered.    Risk  OTC drugs.  Prescription drug management.                               Clinical Impression:   Final diagnoses:  [T16.1XXA] Ear foreign body, right, initial encounter (Primary)  [J02.9] Pharyngitis, unspecified etiology  [R51.9] Nonintractable headache, unspecified chronicity pattern, unspecified headache type - Hx Migraine         ED Disposition Condition    Discharge Stable          ED Prescriptions    None       Follow-up Information       Follow up With Specialties Details Why Contact Info Additional Information    [facility] - ED Emergency Medicine  As needed, If symptoms worsen [address] [place] [address] [address] [place] [address] 1st floor    Buzz Escalante MD Otolaryngology  for ENT evaluation [address] [facility] [address] [phone]       Neurology   for further  evaluation of migraine headaches               Divya Escalante PA-C  11/06/23 1942

## 2023-11-07 NOTE — TELEPHONE ENCOUNTER
Contacted patient, appointment scheduled for 11/14/23 approved per Mr. Quinones to override. Pt advised to go to ER or urgent care if symptoms or pain gets worse.

## 2023-11-14 ENCOUNTER — TELEPHONE (OUTPATIENT)
Dept: OTOLARYNGOLOGY | Facility: CLINIC | Age: 43
End: 2023-11-14
Payer: MEDICARE

## 2023-11-14 NOTE — TELEPHONE ENCOUNTER
Returned call to patient, patient advised that we could still see her today even if she is sick with sinus issues. But patient states that she is not able to make it as she is not feeling good. Appointment has been rescheduled for next available, patient placed on wait list.

## 2023-11-14 NOTE — TELEPHONE ENCOUNTER
----- Message from Helen Veras sent at 11/14/2023  8:37 AM CST -----  Regarding: Call back  Type:  Needs Medical Advice    Who Called: Pt      Would the patient rather a call back or a response via [facility]? Call back    Best Call Back Number: [phone]    Additional Information: Pt have upcoming ppt but there is no appt for me to reschedule appt for ( Pt is sick). Pt would like an appt for a later day and would like a call back. Thank you

## 2023-11-22 ENCOUNTER — HOSPITAL ENCOUNTER (EMERGENCY)
Facility: HOSPITAL | Age: 43
Discharge: HOME | End: 2023-11-22
Attending: STUDENT IN AN ORGANIZED HEALTH CARE EDUCATION/TRAINING PROGRAM
Payer: MEDICARE

## 2023-11-22 VITALS
HEIGHT: 69 IN | OXYGEN SATURATION: 100 % | DIASTOLIC BLOOD PRESSURE: 66 MMHG | RESPIRATION RATE: 18 BRPM | BODY MASS INDEX: 25.03 KG/M2 | SYSTOLIC BLOOD PRESSURE: 125 MMHG | TEMPERATURE: 98 F | WEIGHT: 169 LBS | HEART RATE: 67 BPM

## 2023-11-22 DIAGNOSIS — N83.201 CYST OF RIGHT OVARY: Primary | ICD-10-CM

## 2023-11-22 DIAGNOSIS — R10.31 RLQ ABDOMINAL PAIN: ICD-10-CM

## 2023-11-22 DIAGNOSIS — K52.9 ENTERITIS: ICD-10-CM

## 2023-11-22 DIAGNOSIS — N76.0 BACTERIAL VAGINOSIS: ICD-10-CM

## 2023-11-22 DIAGNOSIS — B96.89 BACTERIAL VAGINOSIS: ICD-10-CM

## 2023-11-22 LAB
ALBUMIN SERPL BCP-MCNC: 4.2 G/DL (ref 3.5–5.2)
ALP SERPL-CCNC: 30 U/L (ref 55–135)
ALT SERPL W/O P-5'-P-CCNC: 12 U/L (ref 10–44)
ANION GAP SERPL CALC-SCNC: 10 MMOL/L (ref 8–16)
AST SERPL-CCNC: 14 U/L (ref 10–40)
BACTERIA GENITAL QL WET PREP: ABNORMAL
BASOPHILS # BLD AUTO: 0.06 K/UL (ref 0–0.2)
BASOPHILS NFR BLD: 0.8 % (ref 0–1.9)
BILIRUB SERPL-MCNC: 0.2 MG/DL (ref 0.1–1)
BILIRUB UR QL STRIP: NEGATIVE
BUN SERPL-MCNC: 6 MG/DL (ref 6–20)
CALCIUM SERPL-MCNC: 9.1 MG/DL (ref 8.7–10.5)
CHLORIDE SERPL-SCNC: 106 MMOL/L (ref 95–110)
CLARITY UR: CLEAR
CLUE CELLS VAG QL WET PREP: ABNORMAL
CO2 SERPL-SCNC: 22 MMOL/L (ref 23–29)
COLOR UR: YELLOW
CREAT SERPL-MCNC: 0.8 MG/DL (ref 0.5–1.4)
DIFFERENTIAL METHOD: ABNORMAL
EOSINOPHIL # BLD AUTO: 0.1 K/UL (ref 0–0.5)
EOSINOPHIL NFR BLD: 0.8 % (ref 0–8)
ERYTHROCYTE [DISTWIDTH] IN BLOOD BY AUTOMATED COUNT: 12.8 % (ref 11.5–14.5)
EST. GFR  (NO RACE VARIABLE): >60 ML/MIN/1.73 M^2
FILAMENT FUNGI VAG WET PREP-#/AREA: ABNORMAL
GLUCOSE SERPL-MCNC: 110 MG/DL (ref 70–110)
GLUCOSE UR QL STRIP: NEGATIVE
HCT VFR BLD AUTO: 36.3 % (ref 37–48.5)
HGB BLD-MCNC: 12.1 G/DL (ref 12–16)
HGB UR QL STRIP: ABNORMAL
IMM GRANULOCYTES # BLD AUTO: 0.01 K/UL (ref 0–0.04)
IMM GRANULOCYTES NFR BLD AUTO: 0.1 % (ref 0–0.5)
KETONES UR QL STRIP: NEGATIVE
LACTATE SERPL-SCNC: 0.8 MMOL/L (ref 0.5–2.2)
LEUKOCYTE ESTERASE UR QL STRIP: NEGATIVE
LIPASE SERPL-CCNC: 12 U/L (ref 4–60)
LYMPHOCYTES # BLD AUTO: 2 K/UL (ref 1–4.8)
LYMPHOCYTES NFR BLD: 28.1 % (ref 18–48)
MCH RBC QN AUTO: 28.7 PG (ref 27–31)
MCHC RBC AUTO-ENTMCNC: 33.3 G/DL (ref 32–36)
MCV RBC AUTO: 86 FL (ref 82–98)
MONOCYTES # BLD AUTO: 0.5 K/UL (ref 0.3–1)
MONOCYTES NFR BLD: 6.8 % (ref 4–15)
NEUTROPHILS # BLD AUTO: 4.6 K/UL (ref 1.8–7.7)
NEUTROPHILS NFR BLD: 63.4 % (ref 38–73)
NITRITE UR QL STRIP: NEGATIVE
NRBC BLD-RTO: 0 /100 WBC
PH UR STRIP: 8 [PH] (ref 5–8)
PLATELET # BLD AUTO: 299 K/UL (ref 150–450)
PMV BLD AUTO: 10.4 FL (ref 9.2–12.9)
POTASSIUM SERPL-SCNC: 4.5 MMOL/L (ref 3.5–5.1)
PROT SERPL-MCNC: 7 G/DL (ref 6–8.4)
PROT UR QL STRIP: NEGATIVE
RBC # BLD AUTO: 4.21 M/UL (ref 4–5.4)
SODIUM SERPL-SCNC: 138 MMOL/L (ref 136–145)
SP GR UR STRIP: 1.01 (ref 1–1.03)
SPECIMEN SOURCE: ABNORMAL
T VAGINALIS GENITAL QL WET PREP: ABNORMAL
URN SPEC COLLECT METH UR: ABNORMAL
UROBILINOGEN UR STRIP-ACNC: NEGATIVE EU/DL
WBC # BLD AUTO: 7.25 K/UL (ref 3.9–12.7)
WBC #/AREA VAG WET PREP: ABNORMAL
YEAST GENITAL QL WET PREP: ABNORMAL

## 2023-11-22 PROCEDURE — 36415 COLL VENOUS BLD VENIPUNCTURE: CPT | Performed by: STUDENT IN AN ORGANIZED HEALTH CARE EDUCATION/TRAINING PROGRAM

## 2023-11-22 PROCEDURE — 96375 TX/PRO/DX INJ NEW DRUG ADDON: CPT

## 2023-11-22 PROCEDURE — 85025 COMPLETE CBC W/AUTO DIFF WBC: CPT | Performed by: STUDENT IN AN ORGANIZED HEALTH CARE EDUCATION/TRAINING PROGRAM

## 2023-11-22 PROCEDURE — 96361 HYDRATE IV INFUSION ADD-ON: CPT

## 2023-11-22 PROCEDURE — 83690 ASSAY OF LIPASE: CPT | Performed by: STUDENT IN AN ORGANIZED HEALTH CARE EDUCATION/TRAINING PROGRAM

## 2023-11-22 PROCEDURE — 99285 EMERGENCY DEPT VISIT HI MDM: CPT | Mod: 25

## 2023-11-22 PROCEDURE — 81003 URINALYSIS AUTO W/O SCOPE: CPT | Performed by: STUDENT IN AN ORGANIZED HEALTH CARE EDUCATION/TRAINING PROGRAM

## 2023-11-22 PROCEDURE — 87210 SMEAR WET MOUNT SALINE/INK: CPT | Performed by: STUDENT IN AN ORGANIZED HEALTH CARE EDUCATION/TRAINING PROGRAM

## 2023-11-22 PROCEDURE — 87491 CHLMYD TRACH DNA AMP PROBE: CPT | Performed by: STUDENT IN AN ORGANIZED HEALTH CARE EDUCATION/TRAINING PROGRAM

## 2023-11-22 PROCEDURE — 80053 COMPREHEN METABOLIC PANEL: CPT | Performed by: STUDENT IN AN ORGANIZED HEALTH CARE EDUCATION/TRAINING PROGRAM

## 2023-11-22 PROCEDURE — 63600175 PHARM REV CODE 636 W HCPCS: Performed by: STUDENT IN AN ORGANIZED HEALTH CARE EDUCATION/TRAINING PROGRAM

## 2023-11-22 PROCEDURE — 87591 N.GONORRHOEAE DNA AMP PROB: CPT | Performed by: STUDENT IN AN ORGANIZED HEALTH CARE EDUCATION/TRAINING PROGRAM

## 2023-11-22 PROCEDURE — 96374 THER/PROPH/DIAG INJ IV PUSH: CPT

## 2023-11-22 PROCEDURE — 25500020 PHARM REV CODE 255

## 2023-11-22 PROCEDURE — 83605 ASSAY OF LACTIC ACID: CPT | Performed by: STUDENT IN AN ORGANIZED HEALTH CARE EDUCATION/TRAINING PROGRAM

## 2023-11-22 PROCEDURE — 25000003 PHARM REV CODE 250: Performed by: STUDENT IN AN ORGANIZED HEALTH CARE EDUCATION/TRAINING PROGRAM

## 2023-11-22 RX ORDER — HYDROCODONE BITARTRATE AND ACETAMINOPHEN 5; 325 MG/1; MG/1
1 TABLET ORAL
Status: COMPLETED | OUTPATIENT
Start: 2023-11-22 | End: 2023-11-22

## 2023-11-22 RX ORDER — DICLOFENAC POTASSIUM 50 MG/1
50 TABLET, FILM COATED ORAL 3 TIMES DAILY PRN
Qty: 18 TABLET | Refills: 0 | Status: SHIPPED | OUTPATIENT
Start: 2023-11-22

## 2023-11-22 RX ORDER — METRONIDAZOLE 500 MG/1
500 TABLET ORAL 3 TIMES DAILY
Qty: 21 TABLET | Refills: 0 | Status: SHIPPED | OUTPATIENT
Start: 2023-11-22 | End: 2023-11-22 | Stop reason: SDUPTHER

## 2023-11-22 RX ORDER — METRONIDAZOLE 500 MG/1
500 TABLET ORAL EVERY 12 HOURS
Qty: 14 TABLET | Refills: 0 | Status: SHIPPED | OUTPATIENT
Start: 2023-11-22 | End: 2023-11-29

## 2023-11-22 RX ORDER — KETOROLAC TROMETHAMINE 30 MG/ML
15 INJECTION, SOLUTION INTRAMUSCULAR; INTRAVENOUS
Status: COMPLETED | OUTPATIENT
Start: 2023-11-22 | End: 2023-11-22

## 2023-11-22 RX ORDER — FENTANYL CITRATE 50 UG/ML
25 INJECTION, SOLUTION INTRAMUSCULAR; INTRAVENOUS
Status: COMPLETED | OUTPATIENT
Start: 2023-11-22 | End: 2023-11-22

## 2023-11-22 RX ADMIN — KETOROLAC TROMETHAMINE 15 MG: 30 INJECTION, SOLUTION INTRAMUSCULAR at 06:11

## 2023-11-22 RX ADMIN — SODIUM CHLORIDE 1000 ML: 9 INJECTION, SOLUTION INTRAVENOUS at 03:11

## 2023-11-22 RX ADMIN — HYDROCODONE BITARTRATE AND ACETAMINOPHEN 1 TABLET: 5; 325 TABLET ORAL at 07:11

## 2023-11-22 RX ADMIN — FENTANYL CITRATE 25 MCG: 50 INJECTION INTRAMUSCULAR; INTRAVENOUS at 03:11

## 2023-11-22 RX ADMIN — IOHEXOL 75 ML: 350 INJECTION, SOLUTION INTRAVENOUS at 04:11

## 2023-11-22 NOTE — ED PROVIDER NOTES
Encounter Date: 2023       History     Chief Complaint   Patient presents with    Abdominal Pain     X4 days pt has had hysterectomy 12 years ago     Vaginal Bleeding     43-year-old male presents with right lower quadrant pain.  Onset for the last 3 days.  History of chronic pelvic pain however this feels different.  Pain with movement, associated decreased appetite.  No trauma, fever or chills.  No new vaginal discharge .  Patient has had dysuria x1 month.  Daughter is bedside also    The history is provided by the patient (Daughter).     Review of patient's allergies indicates:   Allergen Reactions    Paxil [paroxetine hcl] Anaphylaxis    Paxil [paroxetine hcl] Other (See Comments)     Past Medical History:   Diagnosis Date    Anxiety     Depression     Encounter for blood transfusion , ,     History of cervical cancer 2006    diagnosed , hysterectomy     History of hepatitis C, s/p successful Rx w/ SVR (cure) 2019     Migraine     previously on topamax, imitrex, fioricet    Migraine headache     Neck pain     Seizures     occurred during benzo detox     Past Surgical History:   Procedure Laterality Date     SECTION       SECTION, CLASSIC      HYSTERECTOMY      MIDDLE EAR SURGERY      TONSILLECTOMY       Family History   Problem Relation Age of Onset    Hypertension Mother     Depression Mother     Alcohol abuse Mother     Miscarriages / Stillbirths Mother     Depression Father     Hypertension Father     Hyperlipidemia Father     Alcohol abuse Father     Asthma Daughter      Social History     Tobacco Use    Smoking status: Former     Current packs/day: 0.00     Average packs/day: 1 pack/day for 21.9 years (21.9 ttl pk-yrs)     Types: Cigarettes     Start date: 1995     Quit date: 2017     Years since quittin.9   Substance Use Topics    Alcohol use: No    Drug use: Yes     Frequency: 1.0 times per week     Types: Heroin     Review of Systems   All other  systems reviewed and are negative.      Physical Exam     Initial Vitals [11/22/23 1235]   BP Pulse Resp Temp SpO2   (!) 142/76 84 18 98.4 °F (36.9 °C) 98 %      MAP       --         Physical Exam    Nursing note and vitals reviewed.  Constitutional: She appears well-developed and well-nourished.   HENT:   Head: Normocephalic and atraumatic.   Eyes: Right eye exhibits no discharge. Left eye exhibits no discharge.   Cardiovascular:  Normal rate and regular rhythm.           Pulmonary/Chest: Effort normal. No stridor. No respiratory distress.   Abdominal: Abdomen is soft. There is abdominal tenderness.   Right lower quadrant tenderness palpation   Genitourinary:    Genitourinary Comments: White discharge  in vaginal vault and around cervix.     Musculoskeletal:         General: No tenderness.     Neurological: She is alert.   Skin: Skin is warm. No rash noted. No erythema.         ED Course   Procedures  Labs Reviewed   VAGINAL SCREEN - Abnormal; Notable for the following components:       Result Value    Clue Cells Moderate (*)     Bacteria - Vaginal Screen Few (*)     All other components within normal limits    Narrative:     Release to patient->Immediate   CBC W/ AUTO DIFFERENTIAL - Abnormal; Notable for the following components:    Hematocrit 36.3 (*)     All other components within normal limits   COMPREHENSIVE METABOLIC PANEL - Abnormal; Notable for the following components:    CO2 22 (*)     Alkaline Phosphatase 30 (*)     All other components within normal limits   URINALYSIS, REFLEX TO URINE CULTURE - Abnormal; Notable for the following components:    Occult Blood UA Trace (*)     All other components within normal limits    Narrative:     Specimen Source->Urine   LIPASE   LACTIC ACID, PLASMA   C.TRACH/N.GONOR AMP RNA, VARIES          Imaging Results              US Pelvis Complete Non OB (In process)                      CT Abdomen Pelvis With IV Contrast NO Oral Contrast (In process)                       Medications   sodium chloride 0.9% bolus 1,000 mL 1,000 mL (0 mLs Intravenous Stopped 11/22/23 1642)   fentaNYL 50 mcg/mL injection 25 mcg (25 mcg Intravenous Given 11/22/23 1542)   iohexoL (OMNIPAQUE 350) 350 mg iodine/mL injection (75 mLs Intravenous Given 11/22/23 1648)   ketorolac injection 15 mg (15 mg Intravenous Given 11/22/23 1856)   HYDROcodone-acetaminophen 5-325 mg per tablet 1 tablet (1 tablet Oral Given 11/22/23 1926)     Medical Decision Making  43-year-old female presents right lower quadrant pain.  Workup initiated, CT imaging with no acute appendicitis or other surgical etiology, enteritis demonstrated right ovarian cyst.  Ultrasound obtained with no evidence of ovarian torsion.  Pelvic exam with mucus around cervical os and in vaginal vault, testing positive for bacterial vaginosis, no suspicion for PID.  Pain control with IV interventions and oral interventions and patient administered IV fluids will be discharged in stable condition with gynecology follow up and strict return precautions for worsening symptoms.    Amount and/or Complexity of Data Reviewed  Labs: ordered. Decision-making details documented in ED Course.  Radiology: ordered.    Risk  Prescription drug management.               ED Course as of 11/22/23 1942 Wed Nov 22, 2023   1608 C. trach. RNA Source: Vagina [KB]   1608 N.gonorroheae, RNA Source: Vagina [KB]   1608 WBC: 7.25 [KB]   1608 Hemoglobin: 12.1 [KB]   1608 Hematocrit(!): 36.3 [KB]   1608 Lipase: 12 [KB]   1608 Sodium: 138 [KB]   1608 Potassium: 4.5 [KB]   1609 Chloride: 106 [KB]   1609 CO2(!): 22 [KB]   1609 Glucose: 110 [KB]   1609 Creatinine: 0.8 [KB]   1609 ALP(!): 30 [KB]   1609 AST: 14 [KB]   1609 ALT: 12 [KB]   1609 Lactate, Jimmie: 0.8 [KB]   1609 Trichomonas: None [KB]   1609 Clue Cells, Wet Prep(!): Moderate [KB]   1609 Budding Yeast: None [KB]   1609 Bacteria - Vaginal Screen(!): Few [KB]   1609 Wet Prep Source: Vagina [KB]   1844 CT imaging per [facility] with no  appendicitis, exam consistent with enteritis and 3.1 cm ovary on right side we will administer Toradol and evaluate for ovarian torsion with ultrasound imaging. [KB]   1937 Ultrasound report per [facility] with no evidence of ovarian torsion. [KB]   1937 No suspicion for PID [KB]      ED Course User Index  [KB] Guero Domínguez Jr.,                         Clinical Impression:  Final diagnoses:  [R10.31] RLQ abdominal pain  [N76.0, B96.89] Bacterial vaginosis  [N83.201] Cyst of right ovary (Primary)  [K52.9] Enteritis          ED Disposition Condition    Discharge Stable          ED Prescriptions       Medication Sig Dispense Start Date End Date Auth. Provider    diclofenac (CATAFLAM) 50 MG tablet Take 1 tablet (50 mg total) by mouth 3 (three) times daily as needed. 18 tablet 11/22/2023 -- Guero Domínguez Jr., DO    metroNIDAZOLE (FLAGYL) 500 MG tablet  (Status: Discontinued) Take 1 tablet (500 mg total) by mouth 3 (three) times daily. for 7 days 21 tablet 11/22/2023 11/22/2023 Guero Domínguez Jr., DO    metroNIDAZOLE (FLAGYL) 500 MG tablet Take 1 tablet (500 mg total) by mouth every 12 (twelve) hours. for 7 days 14 tablet 11/22/2023 11/29/2023 Guero Domínguez Jr. DO          Follow-up Information       Follow up With Specialties Details Why Contact Info Additional Information    [facility] In 1 day As needed, If symptoms worsen [address] [place] [address] [address] [place] [id number] 1st floor             Geuro Domínguez Jr., DO  11/22/23 1942

## 2023-11-23 NOTE — DISCHARGE INSTRUCTIONS
Follow up with gynecologist within 1 week for re-evaluation and return to ED for any worsening symptoms.

## 2023-11-25 LAB
C TRACH RRNA SPEC QL NAA+PROBE: NEGATIVE
N GONORRHOEA RRNA SPEC QL NAA+PROBE: NEGATIVE
N.GONORROHEAE, AMP RNA SOURCE: NORMAL
SPECIMEN SOURCE: NORMAL

## 2023-12-05 ENCOUNTER — OFFICE VISIT (OUTPATIENT)
Dept: OTOLARYNGOLOGY | Facility: CLINIC | Age: 43
End: 2023-12-05
Payer: MEDICARE

## 2023-12-05 VITALS
HEART RATE: 69 BPM | RESPIRATION RATE: 18 BRPM | BODY MASS INDEX: 21.62 KG/M2 | DIASTOLIC BLOOD PRESSURE: 71 MMHG | SYSTOLIC BLOOD PRESSURE: 105 MMHG | HEIGHT: 69 IN | WEIGHT: 145.94 LBS

## 2023-12-05 DIAGNOSIS — Z98.890 HISTORY OF TYMPANOPLASTY OF RIGHT EAR: ICD-10-CM

## 2023-12-05 DIAGNOSIS — Z86.69 HISTORY OF MIGRAINE: ICD-10-CM

## 2023-12-05 DIAGNOSIS — H91.93 BILATERAL HEARING LOSS, UNSPECIFIED HEARING LOSS TYPE: ICD-10-CM

## 2023-12-05 DIAGNOSIS — T16.1XXD: Primary | ICD-10-CM

## 2023-12-05 DIAGNOSIS — H93.13 TINNITUS OF BOTH EARS: ICD-10-CM

## 2023-12-05 PROCEDURE — 69200 CLEAR OUTER EAR CANAL: CPT | Mod: S$PBB,RT,, | Performed by: PHYSICIAN ASSISTANT

## 2023-12-05 PROCEDURE — 99999 PR PBB SHADOW E&M-EST. PATIENT-LVL III: CPT | Mod: PBBFAC,,, | Performed by: PHYSICIAN ASSISTANT

## 2023-12-05 PROCEDURE — 99213 OFFICE O/P EST LOW 20 MIN: CPT | Mod: PBBFAC,PO | Performed by: PHYSICIAN ASSISTANT

## 2023-12-05 PROCEDURE — 69200 CLEAR OUTER EAR CANAL: CPT | Mod: PBBFAC,PO,RT | Performed by: PHYSICIAN ASSISTANT

## 2023-12-05 PROCEDURE — 69200 FOREIGN BODY REMOVAL: ICD-10-PCS | Mod: S$PBB,RT,, | Performed by: PHYSICIAN ASSISTANT

## 2023-12-05 PROCEDURE — 99203 OFFICE O/P NEW LOW 30 MIN: CPT | Mod: 25,S$PBB,, | Performed by: PHYSICIAN ASSISTANT

## 2023-12-05 PROCEDURE — 99203 PR OFFICE/OUTPT VISIT, NEW, LEVL III, 30-44 MIN: ICD-10-PCS | Mod: 25,S$PBB,, | Performed by: PHYSICIAN ASSISTANT

## 2023-12-05 PROCEDURE — 99999 PR PBB SHADOW E&M-EST. PATIENT-LVL III: ICD-10-PCS | Mod: PBBFAC,,, | Performed by: PHYSICIAN ASSISTANT

## 2023-12-05 NOTE — PROGRESS NOTES
[facility]    Subjective:      Patient: Keily Moreno Patient PCP: Joc[facility] Primary Doctor         :  1980     Sex:  female      MRN:  [id number]          Date of Visit: 2023      Chief Complaint: Foreign body of right ear canal    Patient ID: Keily Moreno is a 43 y.o. female with h/o of ear infections as a child. Pt had 5 sets of bilateral PE tubes in her youth. She had right tympanoplasty (post-auricular approach) when she was 16 years old. She had a T&A. She recently went to take the bead off of her right ear ring and it got stuck in her right EAC. She went to the ED 2023 and an attempt to get the piece of her right ear ring out. Pt states she has had mild tenderness of right ear canal. She has not had ear drainage. She has had issues with right greater than left muffled hearing prior to her current issues with her piece of her ear ring getting stuck in right ear canal. Pt also has bilateral high-pitched non-pulsatile tinnitus that has been an ongoing issue.     Pt does have issues with allergies. She used to take allegra. She now uses flonase daily.    She has issues with migraine headache and takes nurtec and also is on gabapentin 100mg daily. She is followed at [facility]. She is currently trying to wean off of gabapentin.     Past Medical History  She has a past medical history of Anxiety, Depression, Encounter for blood transfusion, History of cervical cancer, History of hepatitis C, s/p successful Rx w/ SVR (cure) , Migraine, Migraine headache, Neck pain, and Seizures.    Family History  Her family history includes Alcohol abuse in her father and mother; Asthma in her daughter; Depression in her father and mother; Hyperlipidemia in her father; Hypertension in her father and mother; Miscarriages / Stillbirths in her mother.    Past Surgical History:   Procedure Laterality Date     SECTION       SECTION, CLASSIC      HYSTERECTOMY      MIDDLE EAR SURGERY       "TONSILLECTOMY       Social History     Tobacco Use    Smoking status: Former     Current packs/day: 0.00     Average packs/day: 1 pack/day for 21.9 years (21.9 ttl pk-yrs)     Types: Cigarettes     Start date: 1995     Quit date: 2017     Years since quittin.0    Smokeless tobacco: Not on file   Substance and Sexual Activity    Alcohol use: No    Drug use: Yes     Frequency: 1.0 times per week     Types: Heroin    Sexual activity: Yes     Birth control/protection: Surgical     Medications  She has a current medication list which includes the following prescription(s): clonazepam, diclofenac, gabapentin, lamotrigine, and nurtec.    Review of patient's allergies indicates:   Allergen Reactions    Paxil [paroxetine hcl] Anaphylaxis    Paxil [paroxetine hcl] Other (See Comments)     All medications, allergies, and past history have been reviewed.    Objective:      Vitals:      2023     9:46 AM 2023    12:35 PM 2023    11:16 AM   Vitals - 1 value per visit   SYSTOLIC 127 142 105   DIASTOLIC 60 76 71   Pulse 80 84 69   Temp 97.7 °F (36.5 °C) 98.4 °F (36.9 °C)    Resp 18 18 18   SPO2 98 % 98 %    Weight (lb) 169 169 145.94   Weight (kg) 76.658 76.658 66.2   Height 5' 9" (1.753 m) 5' 9" (1.753 m) 5' 9" (1.753 m)   BMI (Calculated) 24.9 24.9 21.5       Body surface area is 1.8 meters squared.    Physical Exam  Constitutional:       General: She is not in acute distress.     Appearance: Normal appearance. She is not ill-appearing.   HENT:      Head: Normocephalic and atraumatic.      Right Ear: External ear normal. A foreign body (round green bead stuck in right EAC) is present. Tympanic membrane is scarred (central TM scarring s/p PE tubes).      Left Ear: Ear canal and external ear normal. Tympanic membrane is scarred (central TM scarring s/p PE tubes).      Ears:      Comments: No TM perforation or purulent ear drainage. Mild amount of cerumen suctioned in office. No JEFFREY    Well-healed right " post-auricular incision.     Nose: Nose normal.      Mouth/Throat:      Lips: Pink. No lesions.      Mouth: Mucous membranes are moist. No oral lesions.      Tongue: No lesions.      Palate: No lesions.      Pharynx: Oropharynx is clear. Uvula midline. No pharyngeal swelling, oropharyngeal exudate, posterior oropharyngeal erythema or uvula swelling.      Tonsils: 0 on the right. 0 on the left.      Comments: S/p T&A  Eyes:      General:         Right eye: No discharge.         Left eye: No discharge.      Extraocular Movements: Extraocular movements intact.      Conjunctiva/sclera: Conjunctivae normal.   Pulmonary:      Effort: Pulmonary effort is normal.   Neurological:      General: No focal deficit present.      Mental Status: She is alert and oriented to person, place, and time. Mental status is at baseline.   Psychiatric:         Mood and Affect: Mood normal.         Behavior: Behavior normal.         Thought Content: Thought content normal.         Judgment: Judgment normal.       Foreign Body Removal     Date/Time: 12/5/2023 11:15 AM     Performed by: Zay Quinones PA-C  Authorized by: Zay Quinones PA-C  Consent Done: Yes  Consent: Verbal consent obtained.  Consent given by: patient  Patient identity confirmed: verbally with patient  Body area: ear  Location details: right ear  Anesthesia: see MAR for details     Patient sedated: no  Patient restrained: no  Patient cooperative: yes  Localization method: ENT speculum  Removal mechanism: pahram and curette.  Complexity: simple  1 objects recovered.  Objects recovered: green round bead  Post-procedure assessment: foreign body removed  Patient tolerance: Patient tolerated the procedure well with no immediate complications       Labs:  WBC   Date Value Ref Range Status   11/22/2023 7.25 3.90 - 12.70 K/uL Final     Platelets   Date Value Ref Range Status   11/22/2023 299 150 - 450 K/uL Final     Creatinine   Date Value Ref Range Status    11/22/2023 0.8 0.5 - 1.4 mg/dL Final     Glucose   Date Value Ref Range Status   11/22/2023 110 70 - 110 mg/dL Final     All lab results and imaging results have been reviewed.    Assessment:        ICD-10-CM ICD-9-CM   1. Acute foreign body of right ear canal, subsequent encounter  T16.1XXD V58.89   2. History of migraine  Z86.69 V12.49   3. Tinnitus of both ears  H93.13 388.30   4. Bilateral hearing loss, unspecified hearing loss type  H91.93 389.9   5. History of tympanoplasty of right ear  Z98.890 V45.89              Plan:      Pt does have issues with allergies. She used to take allegra. She now uses flonase daily. Pt advised that she can switch between OTC anti-histamines such as zyrtec and allegra every 6 months. Continue using flonase.     Acute foreign body of right ear canal, subsequent encounter  -     Foreign Body Removal: Removed today in office. See procedure note above.     History of migraine  -She has issues with migraine headache and takes nurtec and also is on gabapentin 100mg daily. [facility] She is currently trying to wean off of gabapentin. Advised pt about discussing emgality with her neurology provider for migraine prophylaxis.     Tinnitus of both ears/Bilateral hearing loss, unspecified hearing loss type/History of tympanoplasty of right ear  -Pt had 5 sets of bilateral PE tubes in her youth. She had right tympanoplasty (post-auricular approach) when she was 16 years old. She has had issues with right greater than left muffled hearing prior to her current issues with her piece of her ear ring getting stuck in right ear canal. Pt also has bilateral high-pitched non-pulsatile tinnitus that has been an ongoing issue. Proceed with comprehensive audiogram to further assess.    Bead from right ear ring removed from right EAC in office. Follow up in office with comprehensive audiogram to assess hearing.

## 2023-12-05 NOTE — PROCEDURES
Foreign Body Removal    Date/Time: 12/5/2023 11:15 AM    Performed by: Zay Quinones PA-C  Authorized by: Zay Quinones PA-C  Consent Done: Yes  Consent: Verbal consent obtained.  Consent given by: patient  Patient identity confirmed: verbally with patient  Body area: ear  Location details: right ear  Anesthesia: see MAR for details    Patient sedated: no  Patient restrained: no  Patient cooperative: yes  Localization method: ENT speculum  Removal mechanism: parham and curette.  Complexity: simple  1 objects recovered.  Objects recovered: green round bead  Post-procedure assessment: foreign body removed  Patient tolerance: Patient tolerated the procedure well with no immediate complications

## 2024-01-09 ENCOUNTER — OFFICE VISIT (OUTPATIENT)
Dept: OTOLARYNGOLOGY | Facility: CLINIC | Age: 44
End: 2024-01-09
Payer: MEDICARE

## 2024-01-09 ENCOUNTER — CLINICAL SUPPORT (OUTPATIENT)
Dept: AUDIOLOGY | Facility: CLINIC | Age: 44
End: 2024-01-09
Payer: MEDICARE

## 2024-01-09 VITALS
HEART RATE: 79 BPM | BODY MASS INDEX: 22.49 KG/M2 | HEIGHT: 69 IN | SYSTOLIC BLOOD PRESSURE: 95 MMHG | DIASTOLIC BLOOD PRESSURE: 63 MMHG | WEIGHT: 151.88 LBS

## 2024-01-09 DIAGNOSIS — Z01.10 NORMAL HEARING EXAM: Primary | ICD-10-CM

## 2024-01-09 DIAGNOSIS — H93.13 TINNITUS OF BOTH EARS: ICD-10-CM

## 2024-01-09 DIAGNOSIS — Z01.10 NORMAL HEARING NOTED ON EXAMINATION: Primary | ICD-10-CM

## 2024-01-09 PROCEDURE — 99999 PR PBB SHADOW E&M-EST. PATIENT-LVL III: CPT | Mod: PBBFAC,,, | Performed by: PHYSICIAN ASSISTANT

## 2024-01-09 PROCEDURE — 99211 OFF/OP EST MAY X REQ PHY/QHP: CPT | Mod: PBBFAC,PO | Performed by: AUDIOLOGIST

## 2024-01-09 PROCEDURE — 99213 OFFICE O/P EST LOW 20 MIN: CPT | Mod: PBBFAC,27,PO | Performed by: PHYSICIAN ASSISTANT

## 2024-01-09 PROCEDURE — 99999 PR PBB SHADOW E&M-EST. PATIENT-LVL I: CPT | Mod: PBBFAC,,, | Performed by: AUDIOLOGIST

## 2024-01-09 PROCEDURE — 92567 TYMPANOMETRY: CPT | Mod: PBBFAC,PO | Performed by: AUDIOLOGIST

## 2024-01-09 PROCEDURE — 92557 COMPREHENSIVE HEARING TEST: CPT | Mod: PBBFAC,PO | Performed by: AUDIOLOGIST

## 2024-01-09 PROCEDURE — 99213 OFFICE O/P EST LOW 20 MIN: CPT | Mod: S$PBB,,, | Performed by: PHYSICIAN ASSISTANT

## 2024-01-09 RX ORDER — BUTALBITAL, ACETAMINOPHEN AND CAFFEINE 300; 40; 50 MG/1; MG/1; MG/1
1 CAPSULE ORAL
COMMUNITY
Start: 2024-01-04

## 2024-01-09 RX ORDER — CYCLOBENZAPRINE HCL 5 MG
5 TABLET ORAL
COMMUNITY
Start: 2023-11-30

## 2024-01-09 RX ORDER — GABAPENTIN 100 MG/1
100 CAPSULE ORAL 3 TIMES DAILY
COMMUNITY
Start: 2023-12-11

## 2024-01-09 NOTE — PROGRESS NOTES
Keily Moreno was seen 01/09/2024 for an audiological evaluation. Pt was accompanied by child during today's visit. Pertinent complains today include pain. Pt denies history of loud noise exposure and denies early onset of genetic family history of hearing loss. Otoscopy revealed no cerumen in both ears. The tympanic membrane was visualized AU prior to proceeding with the hearing testing.      Results reveal normal hearing from 250-8000Hz bilaterally.    Speech Reception Thresholds were  15 dBHL for the right ear and 15 dBHL for the left ear.    Word recognition scores were excellent bilaterally.   Tympanograms were Type As for the right ear and Type Ad for the left ear.    Audiogram results were reviewed in detail with patient and all questions were answered. Results will be reviewed by the referring provider at the completion of this note. Recommend repeat hearing testing if problems arise and bilateral hearing protection with either muffs or in-ear protection in loud noises. All complaints were addressed during this visit to the patient's satisfaction. Plan of care was discussed in detail with the patient, who agreed with the plan as above.

## 2024-01-09 NOTE — PROGRESS NOTES
[facility]    Subjective:      Patient: Keily Moreno Patient PCP: Jocelyn, Primary Doctor         :  1980     Sex:  female      MRN:  [id number]          Date of Visit: 2024      Chief Complaint: Audiogram follow up    Interval History 2024: Pt presents to clinic for follow up audiogram. Pt has bilateral high-pitched non-pulsatile tinnitus that sometimes varies in pitch. She has had ear surgery and PE tubes as detailed in below history. She had audiogram today in office that shows normal hearing bilaterally. Pt reports no change in hearing since her last OV.     Plan from office visit 2023:  Pt does have issues with allergies. She used to take allegra. She now uses flonase daily. Pt advised that she can switch between OTC anti-histamines such as zyrtec and allegra every 6 months. Continue using flonase.      Acute foreign body of right ear canal, subsequent encounter  -     Foreign Body Removal: Removed today in office. See procedure note above.      History of migraine  -She has issues with migraine headache and takes nurtec and also is on gabapentin 100mg daily. She is followed at [facility]. She is currently trying to wean off of gabapentin. Advised pt about discussing emgality with her neurology provider for migraine prophylaxis.      Tinnitus of both ears/Bilateral hearing loss, unspecified hearing loss type/History of tympanoplasty of right ear  -Pt had 5 sets of bilateral PE tubes in her youth. She had right tympanoplasty (post-auricular approach) when she was 16 years old. She has had issues with right greater than left muffled hearing prior to her current issues with her piece of her ear ring getting stuck in right ear canal. Pt also has bilateral high-pitched non-pulsatile tinnitus that has been an ongoing issue. Proceed with comprehensive audiogram to further assess.    Patient ID [id number]: Keily Moreno is a 43 y.o. female with h/o of ear infections as a child. Pt  had 5 sets of bilateral PE tubes in her youth. She had right tympanoplasty (post-auricular approach) when she was 16 years old. She had a T&A. She recently went to take the bead off of her right ear ring and it got stuck in her right EAC. She went to the ED 2023 and an attempt to get the piece of her right ear ring out. Pt states she has had mild tenderness of right ear canal. She has not had ear drainage. She has had issues with right greater than left muffled hearing prior to her current issues with her piece of her ear ring getting stuck in right ear canal. Pt also has bilateral high-pitched non-pulsatile tinnitus that has been an ongoing issue.     Pt does have issues with allergies. She used to take allegra. She now uses flonase daily.    She has issues with migraine headache and takes nurtec and also is on gabapentin 100mg daily. She is followed at [facility]. She is currently trying to wean off of gabapentin.     Past Medical History  She has a past medical history of Anxiety, Depression, Encounter for blood transfusion, History of cervical cancer, History of hepatitis C, s/p successful Rx w/ SVR (cure) , Migraine, Migraine headache, Neck pain, and Seizures.    Family History  Her family history includes Alcohol abuse in her father and mother; Asthma in her daughter; Depression in her father and mother; Hyperlipidemia in her father; Hypertension in her father and mother; Miscarriages / Stillbirths in her mother.    Past Surgical History:   Procedure Laterality Date     SECTION       SECTION, CLASSIC      HYSTERECTOMY      MIDDLE EAR SURGERY      TONSILLECTOMY       Social History     Tobacco Use    Smoking status: Former     Current packs/day: 0.00     Average packs/day: 1 pack/day for 21.9 years (21.9 ttl pk-yrs)     Types: Cigarettes     Start date: 1995     Quit date: 2017     Years since quittin.1    Smokeless tobacco: Not on file   Substance and Sexual Activity     "Alcohol use: No    Drug use: Yes     Frequency: 1.0 times per week     Types: Heroin    Sexual activity: Yes     Birth control/protection: Surgical     Medications  She has a current medication list which includes the following prescription(s): butalbital-acetaminophen-caff, clonazepam, cyclobenzaprine, gabapentin, lamotrigine, nurtec, and diclofenac.    Review of patient's allergies indicates:   Allergen Reactions    Paxil [paroxetine hcl] Anaphylaxis    Paxil [paroxetine hcl] Other (See Comments)     All medications, allergies, and past history have been reviewed.    Objective:      Vitals:      11/22/2023    12:35 PM 12/5/2023    11:16 AM 1/9/2024     3:05 PM   Vitals - 1 value per visit   SYSTOLIC 142 105 95   DIASTOLIC 76 71 63   Pulse 84 69 79   Temp 98.4 °F (36.9 °C)     Resp 18 18    SPO2 98 %     Weight (lb) 169 145.94 151.9   Weight (kg) 76.658 66.2 68.9   Height 5' 9" (1.753 m) 5' 9" (1.753 m) 5' 9" (1.753 m)   BMI (Calculated) 24.9 21.5 22.4   Pain Score   Zero       Body surface area is 1.83 meters squared.    Physical Exam  Constitutional:       General: She is not in acute distress.     Appearance: Normal appearance. She is not ill-appearing.   HENT:      Head: Normocephalic and atraumatic.      Right Ear: External ear normal. Tympanic membrane is scarred (central TM scarring s/p PE tubes).      Left Ear: Ear canal and external ear normal. Tympanic membrane is scarred (central TM scarring s/p PE tubes).      Ears:      Comments: No TM perforation or purulent ear drainage. No JEFFREY    Well-healed right post-auricular incision.     Nose: Nose normal.      Mouth/Throat:      Lips: Pink. No lesions.      Mouth: Mucous membranes are moist. No oral lesions.      Tongue: No lesions.      Palate: No lesions.      Pharynx: Oropharynx is clear. Uvula midline. No pharyngeal swelling, oropharyngeal exudate, posterior oropharyngeal erythema or uvula swelling.      Tonsils: 0 on the right. 0 on the left.      " Comments: S/p T&A  Eyes:      General:         Right eye: No discharge.         Left eye: No discharge.      Extraocular Movements: Extraocular movements intact.      Conjunctiva/sclera: Conjunctivae normal.   Pulmonary:      Effort: Pulmonary effort is normal.   Neurological:      General: No focal deficit present.      Mental Status: She is alert and oriented to person, place, and time. Mental status is at baseline.   Psychiatric:         Mood and Affect: Mood normal.         Behavior: Behavior normal.         Thought Content: Thought content normal.         Judgment: Judgment normal.           Labs:  WBC   Date Value Ref Range Status   11/22/2023 7.25 3.90 - 12.70 K/uL Final     Platelets   Date Value Ref Range Status   11/22/2023 299 150 - 450 K/uL Final     Creatinine   Date Value Ref Range Status   11/22/2023 0.8 0.5 - 1.4 mg/dL Final     Glucose   Date Value Ref Range Status   11/22/2023 110 70 - 110 mg/dL Final     Audiogram 01/09/2024    All lab results and imaging results have been reviewed.    Assessment:        ICD-10-CM ICD-9-CM   1. Normal hearing noted on examination  Z01.10 V72.19   2. Tinnitus of both ears  H93.13 388.30            Plan:      Pt advised about masking techniques for tinnitus such as a white noise machine at night or light ambient background music during the day to help mask tinnitus. Hearing aids can also help with tinnitus if pt chooses them in the future. Pt had audiogram today that showed normal hearing bilaterally. Type As tymp AD and type A tymp AS. SRTs 15dB AD and 15dB AS. %AD and 100%AS. We will monitor hearing loss with annual audiograms. Due to tinnitus with normal hearing bilaterally on audiogram, I recommend repeat audiogram in 5 years or sooner if having worsening of hearing sooner than 5 years. Follow up with ENT as needed for ENT issues/concerns.

## 2024-04-10 DIAGNOSIS — I67.1 ANEURYSM OF INTRACRANIAL PORTION OF LEFT INTERNAL CAROTID ARTERY: Primary | ICD-10-CM

## 2024-04-22 ENCOUNTER — HOSPITAL ENCOUNTER (OUTPATIENT)
Dept: RADIOLOGY | Facility: HOSPITAL | Age: 44
Discharge: HOME | End: 2024-04-22
Payer: MEDICARE

## 2024-04-22 ENCOUNTER — HOSPITAL ENCOUNTER (OUTPATIENT)
Dept: RADIOLOGY | Facility: HOSPITAL | Age: 44
Discharge: HOME | End: 2024-04-22
Attending: NURSE PRACTITIONER
Payer: MEDICARE

## 2024-04-22 VITALS — HEIGHT: 69 IN | BODY MASS INDEX: 22.36 KG/M2 | WEIGHT: 151 LBS

## 2024-04-22 DIAGNOSIS — Z12.31 ENCOUNTER FOR SCREENING MAMMOGRAM FOR MALIGNANT NEOPLASM OF BREAST: ICD-10-CM

## 2024-04-22 DIAGNOSIS — I67.1 ANEURYSM OF INTRACRANIAL PORTION OF LEFT INTERNAL CAROTID ARTERY: ICD-10-CM

## 2024-04-22 PROCEDURE — 77067 SCR MAMMO BI INCL CAD: CPT | Mod: TC,PO

## 2024-04-22 PROCEDURE — 70544 MR ANGIOGRAPHY HEAD W/O DYE: CPT | Mod: TC,PO

## 2024-04-22 PROCEDURE — 77063 BREAST TOMOSYNTHESIS BI: CPT | Mod: 26,,, | Performed by: RADIOLOGY

## 2024-04-22 PROCEDURE — 70544 MR ANGIOGRAPHY HEAD W/O DYE: CPT | Mod: 26,,, | Performed by: RADIOLOGY

## 2024-04-22 PROCEDURE — 77067 SCR MAMMO BI INCL CAD: CPT | Mod: 26,,, | Performed by: RADIOLOGY

## 2024-06-03 PROBLEM — N80.103 ENDOMETRIOSIS OF BOTH OVARIES: Status: ACTIVE | Noted: 2024-06-03

## 2024-06-03 PROBLEM — R10.2 PELVIC PAIN SYNDROME: Status: ACTIVE | Noted: 2024-06-03

## 2024-11-06 ENCOUNTER — HOSPITAL ENCOUNTER (EMERGENCY)
Facility: HOSPITAL | Age: 44
Discharge: HOME | End: 2024-11-07
Attending: EMERGENCY MEDICINE
Payer: MEDICARE

## 2024-11-06 DIAGNOSIS — E87.6 HYPOKALEMIA: Primary | ICD-10-CM

## 2024-11-06 DIAGNOSIS — N89.8: ICD-10-CM

## 2024-11-06 PROCEDURE — 99285 EMERGENCY DEPT VISIT HI MDM: CPT

## 2024-11-07 ENCOUNTER — NURSE TRIAGE (OUTPATIENT)
Dept: ADMINISTRATIVE | Facility: CLINIC | Age: 44
End: 2024-11-07
Payer: MEDICARE

## 2024-11-07 VITALS
DIASTOLIC BLOOD PRESSURE: 49 MMHG | BODY MASS INDEX: 22.05 KG/M2 | HEART RATE: 93 BPM | OXYGEN SATURATION: 100 % | RESPIRATION RATE: 15 BRPM | SYSTOLIC BLOOD PRESSURE: 99 MMHG | WEIGHT: 145 LBS | TEMPERATURE: 98 F

## 2024-11-07 LAB
ALBUMIN SERPL BCP-MCNC: 4.1 G/DL (ref 3.5–5.2)
ALP SERPL-CCNC: 37 U/L (ref 40–150)
ALT SERPL W/O P-5'-P-CCNC: 20 U/L (ref 10–44)
ANION GAP SERPL CALC-SCNC: 12 MMOL/L (ref 8–16)
AST SERPL-CCNC: 16 U/L (ref 10–40)
BASOPHILS # BLD AUTO: 0.05 K/UL (ref 0–0.2)
BASOPHILS NFR BLD: 0.4 % (ref 0–1.9)
BILIRUB SERPL-MCNC: 0.4 MG/DL (ref 0.1–1)
BUN SERPL-MCNC: 9 MG/DL (ref 6–20)
CALCIUM SERPL-MCNC: 9.4 MG/DL (ref 8.7–10.5)
CHLORIDE SERPL-SCNC: 107 MMOL/L (ref 95–110)
CO2 SERPL-SCNC: 23 MMOL/L (ref 23–29)
CREAT SERPL-MCNC: 0.9 MG/DL (ref 0.5–1.4)
DIFFERENTIAL METHOD BLD: ABNORMAL
EOSINOPHIL # BLD AUTO: 0.2 K/UL (ref 0–0.5)
EOSINOPHIL NFR BLD: 1.5 % (ref 0–8)
ERYTHROCYTE [DISTWIDTH] IN BLOOD BY AUTOMATED COUNT: 12.8 % (ref 11.5–14.5)
EST. GFR  (NO RACE VARIABLE): >60 ML/MIN/1.73 M^2
GLUCOSE SERPL-MCNC: 108 MG/DL (ref 70–110)
HCT VFR BLD AUTO: 40.7 % (ref 37–48.5)
HGB BLD-MCNC: 13.3 G/DL (ref 12–16)
IMM GRANULOCYTES # BLD AUTO: 0.03 K/UL (ref 0–0.04)
IMM GRANULOCYTES NFR BLD AUTO: 0.2 % (ref 0–0.5)
LYMPHOCYTES # BLD AUTO: 3.5 K/UL (ref 1–4.8)
LYMPHOCYTES NFR BLD: 27.1 % (ref 18–48)
MCH RBC QN AUTO: 28.5 PG (ref 27–31)
MCHC RBC AUTO-ENTMCNC: 32.7 G/DL (ref 32–36)
MCV RBC AUTO: 87 FL (ref 82–98)
MONOCYTES # BLD AUTO: 0.4 K/UL (ref 0.3–1)
MONOCYTES NFR BLD: 3 % (ref 4–15)
NEUTROPHILS # BLD AUTO: 8.7 K/UL (ref 1.8–7.7)
NEUTROPHILS NFR BLD: 67.8 % (ref 38–73)
NRBC BLD-RTO: 0 /100 WBC
PLATELET # BLD AUTO: 300 K/UL (ref 150–450)
PMV BLD AUTO: 10.6 FL (ref 9.2–12.9)
POTASSIUM SERPL-SCNC: 3.2 MMOL/L (ref 3.5–5.1)
PROT SERPL-MCNC: 7 G/DL (ref 6–8.4)
RBC # BLD AUTO: 4.66 M/UL (ref 4–5.4)
SODIUM SERPL-SCNC: 142 MMOL/L (ref 136–145)
WBC # BLD AUTO: 12.84 K/UL (ref 3.9–12.7)

## 2024-11-07 PROCEDURE — 80053 COMPREHEN METABOLIC PANEL: CPT | Performed by: EMERGENCY MEDICINE

## 2024-11-07 PROCEDURE — 25000003 PHARM REV CODE 250: Performed by: EMERGENCY MEDICINE

## 2024-11-07 PROCEDURE — 25500020 PHARM REV CODE 255

## 2024-11-07 PROCEDURE — 85025 COMPLETE CBC W/AUTO DIFF WBC: CPT | Performed by: EMERGENCY MEDICINE

## 2024-11-07 PROCEDURE — 36415 COLL VENOUS BLD VENIPUNCTURE: CPT | Performed by: EMERGENCY MEDICINE

## 2024-11-07 PROCEDURE — 96374 THER/PROPH/DIAG INJ IV PUSH: CPT | Mod: 59

## 2024-11-07 PROCEDURE — 63600175 PHARM REV CODE 636 W HCPCS: Performed by: EMERGENCY MEDICINE

## 2024-11-07 RX ORDER — POTASSIUM CHLORIDE 20 MEQ/1
40 TABLET, EXTENDED RELEASE ORAL
Status: COMPLETED | OUTPATIENT
Start: 2024-11-07 | End: 2024-11-07

## 2024-11-07 RX ORDER — HYDROMORPHONE HYDROCHLORIDE 1 MG/ML
0.5 INJECTION, SOLUTION INTRAMUSCULAR; INTRAVENOUS; SUBCUTANEOUS
Status: COMPLETED | OUTPATIENT
Start: 2024-11-07 | End: 2024-11-07

## 2024-11-07 RX ORDER — POTASSIUM CHLORIDE 20 MEQ/1
20 TABLET, EXTENDED RELEASE ORAL 2 TIMES DAILY
Qty: 20 TABLET | Refills: 0 | Status: SHIPPED | OUTPATIENT
Start: 2024-11-07

## 2024-11-07 RX ADMIN — POTASSIUM CHLORIDE 40 MEQ: 1500 TABLET, EXTENDED RELEASE ORAL at 01:11

## 2024-11-07 RX ADMIN — HYDROMORPHONE HYDROCHLORIDE 0.5 MG: 1 INJECTION, SOLUTION INTRAMUSCULAR; INTRAVENOUS; SUBCUTANEOUS at 12:11

## 2024-11-07 RX ADMIN — IOHEXOL 100 ML: 350 INJECTION, SOLUTION INTRAVENOUS at 12:11

## 2024-11-07 NOTE — ED NOTES
"Pt AAOx4 NAD pt reports "feeling so much better than before." Discharge and follow instructions reviewed with pt, understanding verbalized. Pt ambulated off unit with her son.  "

## 2024-11-07 NOTE — ED PROVIDER NOTES
Encounter Date: 2024       History     Chief Complaint   Patient presents with    Vaginal Bleeding     Patient had a total hysterectomy on , had stitch rupture from vaginal cuff post surgery. Patient had intercourse two hours and began to experience pain. After changing standing up blood began to gush, states belly is firm and tender.      Chief complaint: Pelvic pain    HPI:  44-year-old female presents with severe pelvic pain after intercourse.  She has had multiple pelvic surgeries and underwent a total abdominal hysterectomy in 2024.  She reports that she developed postcoital bleeding similar to menses.  She also has diffuse abdominal pain.  Past medical history is significant for anxiety, chronic opiate dependence, hepatitis-C, migraine headaches, seizures, cervical cancer, and chronic neck pain.      Review of patient's allergies indicates:   Allergen Reactions    Paxil [paroxetine hcl] Anaphylaxis     Past Medical History:   Diagnosis Date    Anxiety     Cancer     cervical---hysterectomy     Depression     Encounter for blood transfusion , ,     History of hepatitis C, s/p successful Rx w/ SVR (cure)      Migraine     previously on topamax, imitrex, fioricet    Neck pain     Seizures     occurred during benzo detox     Past Surgical History:   Procedure Laterality Date    BARTHOLIN GLAND CYST EXCISION      CERVIX REMOVAL N/A 6/3/2024    Procedure: TRACHELECTOMY;  Surgeon: Rashida Membreno MD;  Location: [facility] OR;  Service: Gynecology Oncology;  Laterality: N/A;     SECTION       SECTION, CLASSIC      ENDOMETRIAL ABLATION      HYSTERECTOMY      MIDDLE EAR SURGERY      ROBOT-ASSISTED LAPAROSCOPIC LYSIS OF ADHESIONS USING DA VIRGIL XI N/A 6/3/2024    Procedure: XI ROBOTIC LYSIS, ADHESIONS;  Surgeon: Rashida Membreno MD;  Location: [facility] OR;  Service: Gynecology Oncology;  Laterality: N/A;    ROBOT-ASSISTED LAPAROSCOPIC SALPINGO-OOPHORECTOMY  Bilateral 6/3/2024    Procedure: ROBOTIC SALPINGO-OOPHORECTOMY w/ ERAS;  Surgeon: Rashida Membreno MD;  Location: [facility] OR;  Service: Gynecology Oncology;  Laterality: Bilateral;    TONSILLECTOMY       Family History   Problem Relation Name Age of Onset    Hypertension Mother      Depression Mother      Alcohol abuse Mother      Miscarriages / Stillbirths Mother      Depression Father      Hypertension Father      Hyperlipidemia Father      Alcohol abuse Father      Asthma Daughter      Ovarian cancer Maternal Grandmother       Social History     Tobacco Use    Smoking status: Former     Current packs/day: 0.00     Average packs/day: 1 pack/day for 21.9 years (21.9 ttl pk-yrs)     Types: Cigarettes     Start date: 1995     Quit date: 2017     Years since quittin.9   Substance Use Topics    Alcohol use: No    Drug use: Yes     Frequency: 1.0 times per week     Types: Heroin, Marijuana     Comment: sober 2016     Review of Systems   Constitutional:  Negative for fever.   Eyes:  Negative for visual disturbance.   Respiratory:  Negative for apnea and shortness of breath.    Cardiovascular:  Negative for chest pain and palpitations.   Gastrointestinal:  Positive for abdominal pain. Negative for abdominal distention.   Genitourinary:  Positive for pelvic pain and vaginal bleeding. Negative for difficulty urinating.   Musculoskeletal:  Negative for neck pain.   Skin:  Negative for pallor and rash.   Neurological:  Negative for headaches.   Hematological:  Does not bruise/bleed easily.   Psychiatric/Behavioral:  Negative for agitation.        Physical Exam     Initial Vitals [24 2344]   BP Pulse Resp Temp SpO2   125/65 97 (!) 22 98.1 °F (36.7 °C) 100 %      MAP       --         Physical Exam    Nursing note and vitals reviewed.  Constitutional: She appears well-developed and well-nourished.   HENT:   Head: Normocephalic and atraumatic.   Eyes: Conjunctivae are normal.   Neck: Neck supple.   Normal range  of motion.  Cardiovascular:  Normal rate, regular rhythm and normal heart sounds.     Exam reveals no gallop and no friction rub.       No murmur heard.  Pulmonary/Chest: Effort normal and breath sounds normal. No respiratory distress. She has no wheezes. She has no rhonchi. She has no rales.   Abdominal: Abdomen is soft. She exhibits no distension. There is abdominal tenderness (diffuse abdominal tenderness greatest in the suprapubic region).   Genitourinary:    Genitourinary Comments: Intact vaginal cuff with pink fluid with no active bleeding     Musculoskeletal:         General: Normal range of motion.      Cervical back: Normal range of motion and neck supple.     Neurological: She is alert and oriented to person, place, and time.   Skin: Skin is warm and dry. No erythema.   Psychiatric: She has a normal mood and affect.         ED Course   Procedures  Labs Reviewed   CBC W/ AUTO DIFFERENTIAL - Abnormal       Result Value    WBC 12.84 (*)     RBC 4.66      Hemoglobin 13.3      Hematocrit 40.7      MCV 87      MCH 28.5      MCHC 32.7      RDW 12.8      Platelets 300      MPV 10.6      Immature Granulocytes 0.2      Gran # (ANC) 8.7 (*)     Immature Grans (Abs) 0.03      Lymph # 3.5      Mono # 0.4      Eos # 0.2      Baso # 0.05      nRBC 0      Gran % 67.8      Lymph % 27.1      Mono % 3.0 (*)     Eosinophil % 1.5      Basophil % 0.4      Differential Method Automated      Narrative:     Collection has been rescheduled by [facility] at 11/07/2024 00:15 Reason:   Nurse Draw   COMPREHENSIVE METABOLIC PANEL - Abnormal    Sodium 142      Potassium 3.2 (*)     Chloride 107      CO2 23      Glucose 108      BUN 9      Creatinine 0.9      Calcium 9.4      Total Protein 7.0      Albumin 4.1      Total Bilirubin 0.4      Alkaline Phosphatase 37 (*)     AST 16      ALT 20      eGFR >60      Anion Gap 12      Narrative:     Collection has been rescheduled by [facility] at 11/07/2024 00:15 Reason:   Nurse Draw          Imaging Results               CT Abdomen Pelvis With IV Contrast NO Oral Contrast (Final result)  Result time 11/07/24 01:19:30      Final result by Randall Johns MD (11/07/24 01:19:30)                   Impression:      Fluid filled nondistended small bowel.  Correlate for enteritis or diarrheal disease.    Mild hepatic steatosis.    No acute findings elsewhere in the abdomen or pelvis.      Electronically signed by: Randall Johns  Date:    11/07/2024  Time:    01:19               Narrative:    EXAMINATION:  CT ABDOMEN PELVIS WITH IV CONTRAST    CLINICAL HISTORY:  Abdominal pain, acute, nonlocalized;    TECHNIQUE:  Low dose axial images, sagittal and coronal reformations were obtained from the lung bases to the pubic symphysis following the IV administration of 100 mL of Omnipaque 350 and the oral administration of 30 mL of Omnipaque 350.    COMPARISON:  None.    FINDINGS:  Abdomen:    - Lower thorax:Base of the heart pericardium unremarkable.  Chest wall intact.    - Lung bases: No infiltrates and no nodules.    - Liver: No focal mass.  Mild steatosis.    - Gallbladder: No calcified gallstones.    - Bile Ducts: No evidence of intra or extra hepatic biliary ductal dilation.    - Spleen: Negative.    - Kidneys: No mass or hydronephrosis.    - Adrenals: Unremarkable.    - Pancreas: No mass or peripancreatic fat stranding.    - Retroperitoneum:  No significant adenopathy.    - Vascular: No abdominal aortic aneurysm.  Normal variant double inferior vena cava right side dominant.    - Abdominal wall:  Unremarkable.    Pelvis:    No pelvic mass, adenopathy, with small ascites or physiologic fluid in the pelvis..    Bowel/Mesentery:    No evidence of bowel obstruction or inflammation.  Fluid-filled small intestines without zone of transition or inflammation.    Bones:  No acute osseous abnormality and no suspicious lytic or blastic lesion.                                       Medications   HYDROmorphone injection 0.5 mg (0.5 mg  Intravenous Given 11/7/24 0015)   iohexoL (OMNIPAQUE 350) 350 mg iodine/mL injection (100 mLs  Given 11/7/24 0037)   potassium chloride SA CR tablet 40 mEq (40 mEq Oral Given 11/7/24 0125)     Medical Decision Making  44-year-old female presents with vaginal pain and bleeding after intercourse.  She is 4 months status post total abdominal hysterectomy.  Physical exam fails to demonstrate any definitive evidence of wound dehiscence.  CT of the abdomen and pelvis is normal with no evidence of free air or vaginal cuff perforation.  She has no significant blood loss and is hemodynamically stable.  She is referred to gynecology for ongoing pain.    Amount and/or Complexity of Data Reviewed  Labs: ordered.  Radiology: ordered.    Risk  Prescription drug management.                                      Clinical Impression:  Final diagnoses:  [E87.6] Hypokalemia (Primary)  [N89.8] Vaginal vault hematoma          ED Disposition Condition    Discharge Stable          ED Prescriptions       Medication Sig Dispense Start Date End Date Auth. Provider    potassium chloride SA (KLOR-CON M20) 20 MEQ tablet Take 1 tablet (20 mEq total) by mouth 2 (two) times daily. 20 tablet 11/7/2024 -- Mikhail Coleman III, MD          Follow-up Information       Follow up With Specialties Details Why Contact Info    Rashida Membreno MD Gynecologic Oncology In 2 days  [address] [facility] [address] [place] [id number] [phone]               Mikhail Coleman III, MD  11/07/24 [id number]

## 2024-11-08 ENCOUNTER — TELEPHONE (OUTPATIENT)
Facility: CLINIC | Age: 44
End: 2024-11-08
Payer: MEDICARE

## 2024-11-08 NOTE — TELEPHONE ENCOUNTER
"[facility] Plan of Care Note    Referral source: Nurse On-Call      Reason for consult: Abdominal Pain:"Pt reports seen at the ED last night for Vaginal Bleeding and diagnosed with vaginal vault hematoma. She reports T: 98.1 last night. C/O T: 99.5, not feeling well, worsening lower abdominal pain rated 7/10, constipation (barely able to pass gas - last BM was 8 days ago), decreased fluid intake/appetite, feels like fluid in her abdomen, and nausea. Denies fever, vomiting, SOB, and CP. "      Disposition recommended: Emergency Department      Additional Recommendations:  44-year-old female seen last night in the ED for postcoital vaginal bleeding and pain, CT scan revealed fluid filled loops of bowel, no obstruction.  Patient states that she now has a low-grade fever and much worsened abdominal pain.  Sent to the ED for another evaluation    "

## 2024-11-08 NOTE — TELEPHONE ENCOUNTER
"[facility] follow up call.  Patient spoke with [facility] RN on 11/7/2024 with complaint of: "Pt reports seen at the ED last night for Vaginal Bleeding and diagnosed with vaginal vault hematoma. She reports T: 98.1 last night. C/O T: 99.5, not feeling well, worsening lower abdominal pain rated 7/10, constipation (barely able to pass gas - last BM was 8 days ago), decreased fluid intake/appetite, and nausea. Denies fever, vomiting, SOB, and CP."    [facility] RN consulted with [facility] provider, Dr. Haynes and disposition recommended was for patient to visit the emergency room for further evaluation.  No emergency room encounter noted.      Spoke with patient to see if received the care needed.  Patient requested a call back.        [facility] follow up call, per patient's request.  Patient outreached, left voicemail.  "

## 2024-11-08 NOTE — TELEPHONE ENCOUNTER
"[place]    PCP:  Asuncion Kline NP    Pt reports seen at the ED last night for Vaginal Bleeding and diagnosed with vaginal vault hematoma.  She reports T: 98.1 last night.  C/O T: 99.5, not feeling well, worsening lower abdominal pain rated 7/10, constipation (barely able to pass gas - last BM was 8 days ago), decreased fluid intake/appetite, feels like fluid in her abdomen, and nausea.  Denies fever, vomiting, SOB, and CP.  Per protocol, care advised is go to ED/[facility] now (or PCP triage).  Pt referred to [facility] (Dr. Kamille Haynes).  [facility] Provider recommends:  go to ED now.  Pt VU and will go to [facility] ED.  Advised to call for worsening/questions/concerns.  VU.    Reason for Disposition   Patient sounds very sick or weak to the triager    Additional Information   Negative: Shock suspected (e.g., cold/pale/clammy skin, too weak to stand, low BP, rapid pulse)   Negative: Difficult to awaken or acting confused (e.g., disoriented, slurred speech)   Negative: Passed out (e.g., fainted, lost consciousness, blacked out and was not responding)   Negative: Sounds like a life-threatening emergency to the triager   Negative: [1] SEVERE pain (e.g., excruciating) AND [2] present > 1 hour   Negative: [1] SEVERE pain AND [2] age > 60 years   Negative: [1] Vomiting AND [2] contains red blood or black ("coffee ground") material  (Exception: Few red streaks in vomit that only happened once.)   Negative: Blood in bowel movements  (Exception: Blood on surface of BM with constipation.)   Negative: Black or tarry bowel movements  (Exception: Chronic-unchanged black-grey BMs AND is taking iron pills or Pepto-Bismol.)   Negative: [1] Vomiting AND [2] contains bile (green color)    Protocols used: Abdominal Pain - Female-A-AH    "

## 2025-04-14 DIAGNOSIS — R10.2 PELVIC PAIN SYNDROME: Primary | ICD-10-CM

## 2025-04-26 ENCOUNTER — HOSPITAL ENCOUNTER (OUTPATIENT)
Dept: RADIOLOGY | Facility: HOSPITAL | Age: 45
Discharge: HOME | End: 2025-04-26
Attending: NURSE PRACTITIONER
Payer: MEDICARE

## 2025-04-26 DIAGNOSIS — R10.2 PELVIC PAIN SYNDROME: ICD-10-CM

## 2025-04-26 PROCEDURE — 72197 MRI PELVIS W/O & W/DYE: CPT | Mod: 26,,, | Performed by: RADIOLOGY

## 2025-04-26 PROCEDURE — A9585 GADOBUTROL INJECTION: HCPCS

## 2025-04-26 PROCEDURE — 25500020 PHARM REV CODE 255

## 2025-04-26 PROCEDURE — 72197 MRI PELVIS W/O & W/DYE: CPT | Mod: TC

## 2025-04-26 RX ORDER — GADOBUTROL 604.72 MG/ML
INJECTION INTRAVENOUS
Status: COMPLETED
Start: 2025-04-26 | End: 2025-04-26

## 2025-04-26 RX ADMIN — GADOBUTROL 6 ML: 604.72 INJECTION INTRAVENOUS at 10:04

## 2025-05-20 ENCOUNTER — TELEPHONE (OUTPATIENT)
Dept: OBSTETRICS AND GYNECOLOGY | Facility: CLINIC | Age: 45
End: 2025-05-20
Payer: MEDICARE

## 2025-05-20 NOTE — TELEPHONE ENCOUNTER
Spoke with April and confirm receiving out side documents form [facility]

## 2025-05-21 ENCOUNTER — OFFICE VISIT (OUTPATIENT)
Dept: OBSTETRICS AND GYNECOLOGY | Facility: CLINIC | Age: 45
End: 2025-05-21
Attending: OBSTETRICS & GYNECOLOGY
Payer: MEDICARE

## 2025-05-21 ENCOUNTER — TELEPHONE (OUTPATIENT)
Dept: OBSTETRICS AND GYNECOLOGY | Facility: CLINIC | Age: 45
End: 2025-05-21
Payer: MEDICARE

## 2025-05-21 VITALS
WEIGHT: 145.75 LBS | HEIGHT: 69 IN | BODY MASS INDEX: 21.59 KG/M2 | DIASTOLIC BLOOD PRESSURE: 70 MMHG | SYSTOLIC BLOOD PRESSURE: 100 MMHG

## 2025-05-21 DIAGNOSIS — N94.10 DYSPAREUNIA IN FEMALE: ICD-10-CM

## 2025-05-21 DIAGNOSIS — R10.2 PELVIC PAIN SYNDROME: Primary | ICD-10-CM

## 2025-05-21 DIAGNOSIS — Z87.42 HISTORY OF ENDOMETRIOSIS: ICD-10-CM

## 2025-05-21 PROBLEM — N80.103 ENDOMETRIOSIS OF BOTH OVARIES: Status: RESOLVED | Noted: 2024-06-03 | Resolved: 2025-05-21

## 2025-05-21 PROCEDURE — 99214 OFFICE O/P EST MOD 30 MIN: CPT | Mod: PBBFAC | Performed by: OBSTETRICS & GYNECOLOGY

## 2025-05-21 PROCEDURE — 99999 PR PBB SHADOW E&M-EST. PATIENT-LVL IV: CPT | Mod: PBBFAC,,, | Performed by: OBSTETRICS & GYNECOLOGY

## 2025-05-21 PROCEDURE — 99204 OFFICE O/P NEW MOD 45 MIN: CPT | Mod: S$PBB,,, | Performed by: OBSTETRICS & GYNECOLOGY

## 2025-05-21 NOTE — TELEPHONE ENCOUNTER
Called pt in regards to scheduling, no answer, lvm asking pt to call office along with possible day/time

## 2025-05-21 NOTE — TELEPHONE ENCOUNTER
----- Message from Med Assistant Garcia sent at 5/21/2025 11:44 AM CDT -----  Dr Mann is referring patient to Dr Shaw to consult pelvic pain( endometriosis)Radha

## 2025-05-21 NOTE — PROGRESS NOTES
SUBJECTIVE:   44 y.o. female   for pelvic pain. Patient's last menstrual period was 2011 (approximate)..  She has an extensive surgical history including C/S x 3, hysterectomy for AUB and endometriosis .  Continued with pelvic pain, so subsequent had surgery  with trachelectomy, BSO, extensive KOFFI with Dr. Membreno. She felt good for about 3 months. Then had painful sex around   Went to ED for mgt. Sent home with instructions to follow up GYN MD.  She followed up with Dr. Membreno/ [facility] shortly after.  She was referred to endometriosis specialist- Dr. Pulido in [place]. She continues with pelvic pain and unable to have sex due to pain. Pelvic MRI  shows no masses. Her endometriosis blog recommends a pelvic u/s to assess for disease.  She has hot flashes. Not taking any medications  She has h/o substance d/o and has been sober since .         Past Medical History:   Diagnosis Date    Anxiety     Depression     Encounter for blood transfusion , ,     Endometriosis     History of hepatitis C, s/p successful Rx w/ SVR (cure)      Migraine     previously on topamax, imitrex, fioricet    Neck pain     Seizures     occurred during benzo detox    Substance abuse 2016    sober since  (heroin, MJ)     Past Surgical History:   Procedure Laterality Date    BARTHOLIN GLAND CYST EXCISION      CERVIX LESION DESTRUCTION      cryotherapy    CERVIX REMOVAL N/A 2024    Procedure: TRACHELECTOMY;  Surgeon: Rashida Membreno MD;  [facility]: [facility] OR;  Service: Gynecology Oncology;  Laterality: N/A;     SECTION      x 3    ENDOMETRIAL ABLATION      HYSTERECTOMY      AUB, endometriosis- TIEN vs supracervical hyst?    MIDDLE EAR SURGERY      ROBOT-ASSISTED LAPAROSCOPIC LYSIS OF ADHESIONS USING DA VIRGIL XI N/A 2024    Procedure: XI ROBOTIC LYSIS, ADHESIONS;  Surgeon: Rashida Membreno MD;  Location: [facility] OR;  Service: Gynecology Oncology;  Laterality:  N/A;    ROBOT-ASSISTED LAPAROSCOPIC SALPINGO-OOPHORECTOMY Bilateral 2024    Procedure: ROBOTIC SALPINGO-OOPHORECTOMY w/ ERAS;  Surgeon: Rashida Membreno MD;  Location: [facility];  Service: Gynecology Oncology;  Laterality: Bilateral;    TONSILLECTOMY       Social History[1]  Family History   Problem Relation Name Age of Onset    Ovarian cancer Maternal Grandmother      Colon cancer Maternal Grandfather      Depression Father      Hypertension Father      Hyperlipidemia Father      Alcohol abuse Father      Hypertension Mother      Depression Mother      Alcohol abuse Mother      Miscarriages / Stillbirths Mother      Asthma Daughter      Breast cancer Maternal Aunt       OB History    Para Term  AB Living   3 3 3   3   SAB IAB Ectopic Multiple Live Births       3      # Outcome Date GA Lbr Eric/2nd Weight Sex Type Anes PTL Lv   3 Term      CS-Unspec      2 Term      CS-Unspec      1 Term      CS-Unspec            Current Medications[2]  Allergies: Paxil [paroxetine hcl]     ROS:  Constitutional: no weight loss, weight gain, fever, fatigue  Eyes:  No vision changes, glasses/contacts  ENT/Mouth: No ulcers, sinus problems, ears ringing, headache  Cardiovascular: No inability to lie flat, chest pain, exercise intolerance, swelling, heart palpitations  Respiratory: No wheezing, coughing blood, shortness of breath, or cough  Gastrointestinal: No diarrhea, bloody stool, nausea/vomiting, constipation, gas, hemorrhoids  Genitourinary: No blood in urine, painful urination, urgency of urination, frequency of urination, incomplete emptying, incontinence, abnormal bleeding, painful periods, heavy periods, vaginal discharge, vaginal odor, painful intercourse, sexual problems, bleeding after intercourse.  Musculoskeletal: No muscle weakness  Skin/Breast: No painful breasts, nipple discharge, masses, rash, ulcers  Neurological: No passing out, seizures, numbness, headache  Endocrine: No diabetes, hypothyroid,  "hyperthyroid, +hot flashes, hair loss, abnormal hair growth, acne  Psychiatric: No depression, crying  Hematologic: No bruises, bleeding, swollen lymph nodes, anemia.      OBJECTIVE:   The patient appears well, alert, oriented x 3, in no distress.  /70 (BP Location: Right arm, Patient Position: Sitting)   Ht 5' 9" (1.753 m)   Wt 66.1 kg (145 lb 11.6 oz)   LMP 09/24/2011 (Approximate) Comment: hysterectomy not sure of actual date.  BMI 21.52 kg/m²   ABDOMEN: no hernias, masses, or hepatosplenomegaly  GENITALIA: normal external genitalia, no erythema, no discharge  URETHRA: normal urethra, normal urethral meatus  VAGINA: Normal  CERVIX: absent  UTERUS: absent  ADNEXA: no mass, fullness, tenderness      ASSESSMENT:   1. Pelvic pain syndrome  Ambulatory Referral/Consult to Physical Therapy    Follicle Stimulating Hormone    Estradiol      2. Dyspareunia in female  Ambulatory Referral/Consult to Physical Therapy    Follicle Stimulating Hormone    Estradiol      3. History of endometriosis            PLAN:   Orders Placed This Encounter    Follicle Stimulating Hormone    Estradiol    Ambulatory Referral/Consult to Physical Therapy     I reviewed records from Dr. Membreno and other gyn consults and ER visits for her complaint.  Discussed pelvic pain and history of stage 4 endometriosis, managed with surgery- hysterectomy/ BSO, KOFFI. Normal pelvic MRI 4-2025.  Plan FSH and estradiol to check for ovarian remnant. If some estrogen present, will consider trial of norethindrone vs Myfrmbree/ Orilissa  If in menopausal state with low estradiol and high FSH, then Orilissa/ Myfembree may not help sx. Then I will recommend pelvic pain mgt with pelvic floor PT and office consult with Dr. Shaw for pain mapping/ localized pain mgt.  Patient has had extensive surgery to excise endometriosis, most recently Dr. Membreno 6-2024 with incidental bladder injury due to extensive adhesions. MRI 4-2025 is normal with no evidence of " endometriosis or anatomical lesions.  Therefore, I do not recommend further surgery for pain mgt.  Return to clinic prn         [1]   Social History  Socioeconomic History    Marital status:    Tobacco Use    Smoking status: Former     Current packs/day: 0.00     Average packs/day: 1 pack/day for 21.9 years (21.9 ttl pk-yrs)     Types: Cigarettes     Start date: 1995     Quit date: 2017     Years since quittin.4     Passive exposure: Past   Substance and Sexual Activity    Alcohol use: No    Drug use: Yes     Frequency: 1.0 times per week     Types: Heroin, Marijuana     Comment: sober     Sexual activity: Not Currently     Birth control/protection: See Surgical Hx     Comment: Hysterectomy 2024   Social History Narrative    ** Merged History Encounter **          Social Drivers of Health     Food Insecurity: No Food Insecurity (2024)    Hunger Vital Sign     Worried About Running Out of Food in the Last Year: Never true     Ran Out of Food in the Last Year: Never true   Transportation Needs: No Transportation Needs (2024)    TRANSPORTATION NEEDS     Transportation : No   Housing Stability: Unknown (2024)    Housing Stability Vital Sign     Unable to Pay for Housing in the Last Year: No     Homeless in the Last Year: No   [2]   Current Outpatient Medications   Medication Sig Dispense Refill    buprenorphine-naloxone 8-2 mg (SUBOXONE) 8-2 mg Place 0.25 Film under the tongue every 7 days.      butalbital-acetaminophen-caff -40 mg Cap Take 1 capsule by mouth daily as needed.      clonazePAM (KLONOPIN) 1 MG tablet Take 1 mg by mouth 3 (three) times daily.      gabapentin (NEURONTIN) 100 MG capsule Take 100 mg by mouth 3 (three) times daily.      HYDROcodone-acetaminophen (NORCO) 5-325 mg per tablet Take 1 tablet by mouth every 4 (four) hours as needed for Pain. 30 tablet 0    ibuprofen (ADVIL,MOTRIN) 600 MG tablet Take 1 tablet (600 mg total) by mouth 3 (three) times daily.  30 tablet 1    ketamine HCl (KETAMINE IN SODIUM CHLORIDE 0.9%) 5 mg/ml infusion Inject 150 mLs into the vein. IV infusion every 2 months      lamoTRIgine (LAMICTAL) 150 MG Tab Take 150 mg by mouth once daily.       potassium chloride SA (KLOR-CON M20) 20 MEQ tablet Take 1 tablet (20 mEq total) by mouth 2 (two) times daily. 20 tablet 0    rimegepant (NURTEC) 75 mg odt Take 75 mg by mouth every other day.      senna-docusate 8.6-50 mg (PERICOLACE) 8.6-50 mg per tablet Take 1 tablet by mouth 2 (two) times daily.      sulfamethoxazole-trimethoprim 800-160mg (BACTRIM DS) 800-160 mg Tab Take 1 tablet by mouth once daily. 7 tablet 0    ubrogepant (UBRELVY) 50 mg tablet Take 50 mg by mouth as needed for Migraine. If symptoms persist or return, may repeat dose after 2 hours. Maximum: 200 mg per 24 hours      UNABLE TO FIND Take 1 Dose by mouth once daily. Edible medical marijuana       No current facility-administered medications for this visit.

## 2025-06-16 ENCOUNTER — OFFICE VISIT (OUTPATIENT)
Dept: OBSTETRICS AND GYNECOLOGY | Facility: CLINIC | Age: 45
End: 2025-06-16
Payer: MEDICARE

## 2025-06-16 VITALS
DIASTOLIC BLOOD PRESSURE: 60 MMHG | BODY MASS INDEX: 22 KG/M2 | SYSTOLIC BLOOD PRESSURE: 96 MMHG | WEIGHT: 148.56 LBS | HEIGHT: 69 IN

## 2025-06-16 DIAGNOSIS — K59.02 SPASTIC PELVIC FLOOR SYNDROME: ICD-10-CM

## 2025-06-16 DIAGNOSIS — N80.353 ENDOMETRIOSIS OF BOTH PELVIC SIDEWALLS: Primary | ICD-10-CM

## 2025-06-16 PROCEDURE — 99213 OFFICE O/P EST LOW 20 MIN: CPT | Mod: PBBFAC | Performed by: OBSTETRICS & GYNECOLOGY

## 2025-06-16 PROCEDURE — 99214 OFFICE O/P EST MOD 30 MIN: CPT | Mod: S$PBB,,, | Performed by: OBSTETRICS & GYNECOLOGY

## 2025-06-16 PROCEDURE — 99999 PR PBB SHADOW E&M-EST. PATIENT-LVL III: CPT | Mod: PBBFAC,,, | Performed by: OBSTETRICS & GYNECOLOGY

## 2025-06-16 RX ORDER — LETROZOLE 2.5 MG/1
2.5 TABLET, FILM COATED ORAL DAILY
Qty: 30 TABLET | Refills: 11 | Status: SHIPPED | OUTPATIENT
Start: 2025-06-16 | End: 2026-06-16

## 2025-06-18 PROBLEM — Z87.42 HISTORY OF ENDOMETRIOSIS: Status: RESOLVED | Noted: 2025-05-21 | Resolved: 2025-06-18

## 2025-06-18 NOTE — PROGRESS NOTES
Subjective     Patient ID: Keily Moreno is a 44 y.o. female.    Chief Complaint:  Endometriosis      History of Present Illness  Vaginal Pain  The patient's pertinent negatives include no pelvic pain or vaginal discharge. This is a chronic problem. The current episode started more than 1 year ago. The problem occurs daily. The problem has been waxing and waning. The pain is moderate. The problem affects both sides. She is not pregnant. Associated symptoms include abdominal pain, anorexia, chills, dysuria, frequency, nausea, painful intercourse and vomiting. Pertinent negatives include no back pain, constipation, diarrhea, discolored urine, fever, headaches, rash or urgency. The vaginal discharge was normal. The symptoms are aggravated by activity, bowel movements, intercourse, tactile pressure and urinating. She has tried acetaminophen, heating pad and NSAIDs for the symptoms. The treatment provided mild relief. She is sexually active. No, her partner does not have an STD. She is postmenopausal. Her past medical history is significant for an abdominal surgery, a  section, endometriosis, a gynecological surgery, metrorrhagia and ovarian cysts. There is no history of an ectopic pregnancy, herpes simplex, miscarriage, perineal abscess, PID, an STD, a terminated pregnancy or vaginosis.     Pt is 44 y.o. sent in consultation from Dr. Mann for assessment of pelvic pain.  She has an extensive surgical history including C/S x 3, hysterectomy for AUB and endometriosis .  Continued with pelvic pain, so subsequent had surgery  with trachelectomy, BSO, extensive KOFFI with Dr. Membrneo at which time there was an incidental cystotomy. She felt good for about 3 months. Then had painful sex around   She continues with pelvic pain and unable to have sex due to pain. Pelvic MRI  shows no masses.     Pt is scheduled for pelvic floor PT tomorrow.  Has a supportive partner and looking for next  steps in managing her symptoms.  Not looking for pain medication but a better plan for long term treatment at the cause of her issues.    GYN & OB History  Patient's last menstrual period was 2011 (approximate).   Date of Last Pap: No result found    OB History    Para Term  AB Living   3 3 3 0 0    SAB IAB Ectopic Multiple Live Births   0 0 0        # Outcome Date GA Lbr Eric/2nd Weight Sex Type Anes PTL Lv   3 Term      CS-Unspec      2 Term      CS-Unspec      1 Term      CS-Unspec          Review of Systems  Review of Systems   Constitutional:  Positive for chills. Negative for activity change, appetite change, fatigue and fever.   HENT: Negative.  Negative for tinnitus.    Eyes: Negative.    Respiratory:  Negative for cough and shortness of breath.    Cardiovascular:  Negative for chest pain and palpitations.   Gastrointestinal:  Positive for abdominal pain, anorexia, nausea and vomiting. Negative for blood in stool, constipation and diarrhea.   Endocrine: Negative.  Negative for hot flashes.   Genitourinary:  Positive for dysuria, frequency and vaginal pain. Negative for dysmenorrhea, dyspareunia, menstrual problem, pelvic pain, urgency, vaginal discharge, urinary incontinence and postcoital bleeding.   Musculoskeletal:  Negative for back pain and joint swelling.   Integumentary:  Negative for rash, breast mass, nipple discharge and breast skin changes.   Neurological: Negative.  Negative for headaches.   Hematological: Negative.  Does not bruise/bleed easily.   Psychiatric/Behavioral:  The patient is not nervous/anxious.    Breast: negative.  Negative for mass, nipple discharge and skin changes         Objective   Physical Exam:   Constitutional: She is oriented to person, place, and time. She appears well-developed and well-nourished. No distress.    HENT:   Head: Normocephalic and atraumatic.    Eyes: Pupils are equal, round, and reactive to light. Conjunctivae and lids are normal.      Cardiovascular:  Normal rate.      Exam reveals no edema.        Pulmonary/Chest: Effort normal.        Abdominal: Soft. Bowel sounds are normal. She exhibits no distension. There is no abdominal tenderness. There is no rebound and no guarding.     Genitourinary:    Inguinal canal and vagina normal.      Pelvic exam was performed with patient supine.   The external female genitalia was normal.   Genitalia hair distrobution normal .     Labial bartholins normal.There is no rash on the right labia. There is no rash on the left labia. There is an absent right adnexa and an absent left adnexa. No erythema, vaginal discharge, rectocele or cystocele in the vagina. Cervix is absent.Uterus is absent. Normal urethral meatus.          Musculoskeletal: Normal range of motion and moves all extremeties.       Neurological: She is alert and oriented to person, place, and time. She has normal strength.    Skin: Skin is warm and dry.    Psychiatric: She has a normal mood and affect. Her speech is normal and behavior is normal. Thought content normal. Her mood appears not anxious. She does not exhibit a depressed mood. She expresses no suicidal plans and no homicidal plans.            Assessment and Plan     1. Endometriosis of both pelvic sidewalls    2. Spastic pelvic floor syndrome             Plan:  Keily was seen today for endometriosis.    Diagnoses and all orders for this visit:    Endometriosis of both pelvic sidewalls  -     letrozole (FEMARA) 2.5 mg Tab; Take 1 tablet (2.5 mg total) by mouth once daily.  I had a long discussion with pt about her sx, past surgical hx, and pathophysiology of endometriosis.  Aware that she may have elements of neuropathic pain (remnant from her long standing advanced endo), inflammatory pain, and musculoskeletal issues.  Agree that pelvic floor PT will help her get to the point where she can have penetrative intercourse.  We discussed different tx options for her neuropathic pain and she is  currently on 100mg gabapentin.  Rec trial of tumeric to help with inflammation as pt doesn't like taking NSAID's.  And finally, we discussed different novel treatments for endo.  Since she has had a BSO, would consider aromatase inhibitor like femara to help with her endo.  Discussed consideration of orlissa but pt nervous about side effects.  Can convert to that medication if femara not helping.  All question answered.  F/u 3 months for pain re-assessment.    Spastic pelvic floor syndrome